# Patient Record
Sex: FEMALE | Race: WHITE | HISPANIC OR LATINO | Employment: OTHER | URBAN - METROPOLITAN AREA
[De-identification: names, ages, dates, MRNs, and addresses within clinical notes are randomized per-mention and may not be internally consistent; named-entity substitution may affect disease eponyms.]

---

## 2018-11-16 ENCOUNTER — HOSPITAL ENCOUNTER (EMERGENCY)
Facility: HOSPITAL | Age: 38
Discharge: HOME | End: 2018-11-16
Attending: EMERGENCY MEDICINE | Admitting: EMERGENCY MEDICINE

## 2018-11-16 VITALS
BODY MASS INDEX: 33.66 KG/M2 | HEART RATE: 93 BPM | OXYGEN SATURATION: 100 % | TEMPERATURE: 98.1 F | DIASTOLIC BLOOD PRESSURE: 79 MMHG | HEIGHT: 63 IN | RESPIRATION RATE: 18 BRPM | SYSTOLIC BLOOD PRESSURE: 137 MMHG | WEIGHT: 190 LBS

## 2018-11-16 DIAGNOSIS — R59.0 SUBMANDIBULAR LYMPHADENOPATHY: ICD-10-CM

## 2018-11-16 DIAGNOSIS — R51.9 FACIAL PAIN: Primary | ICD-10-CM

## 2018-11-16 PROCEDURE — 99283 EMERGENCY DEPT VISIT LOW MDM: CPT

## 2018-11-16 PROCEDURE — 25010000002 KETOROLAC TROMETHAMINE PER 15 MG: Performed by: EMERGENCY MEDICINE

## 2018-11-16 PROCEDURE — 96372 THER/PROPH/DIAG INJ SC/IM: CPT

## 2018-11-16 RX ORDER — ASPIRIN 81 MG/1
81 TABLET, CHEWABLE ORAL DAILY
COMMUNITY
End: 2021-09-03

## 2018-11-16 RX ORDER — AMOXICILLIN AND CLAVULANATE POTASSIUM 875; 125 MG/1; MG/1
1 TABLET, FILM COATED ORAL ONCE
Status: COMPLETED | OUTPATIENT
Start: 2018-11-16 | End: 2018-11-16

## 2018-11-16 RX ORDER — AMOXICILLIN AND CLAVULANATE POTASSIUM 875; 125 MG/1; MG/1
1 TABLET, FILM COATED ORAL EVERY 12 HOURS SCHEDULED
Qty: 14 TABLET | Refills: 0 | Status: SHIPPED | OUTPATIENT
Start: 2018-11-16 | End: 2020-01-01

## 2018-11-16 RX ORDER — KETOROLAC TROMETHAMINE 15 MG/ML
15 INJECTION, SOLUTION INTRAMUSCULAR; INTRAVENOUS ONCE
Status: COMPLETED | OUTPATIENT
Start: 2018-11-16 | End: 2018-11-16

## 2018-11-16 RX ADMIN — AMOXICILLIN AND CLAVULANATE POTASSIUM 1 TABLET: 875; 125 TABLET, FILM COATED ORAL at 07:04

## 2018-11-16 RX ADMIN — KETOROLAC TROMETHAMINE 15 MG: 15 INJECTION, SOLUTION INTRAMUSCULAR; INTRAVENOUS at 07:03

## 2018-11-16 NOTE — ED PROVIDER NOTES
Subjective   This 38-year-old female presents to the emergency department complaining of right facial pain which started 2 days ago.  Her discomfort is gradually increased in severity.  She now rates it 5-6 out of 10 on the pain scale.  She tried aspirin without significant improvement in discomfort.  She denies fevers, chills, nausea, vomiting.  She does report increased discomfort in the right lower jaw region occurring when she opens her mouth widely.  She does not believe she has any dental caries nor that her discomfort could be coming from her teeth.  She notes a swollen area lateral to her molars on the right side.  She notes that when she squeezes the area firmly this seems to fleeting only improve the discomfort.  She denies voice change, pain with swallowing, decreased range of motion of the neck.  Her discomfort radiates from the area of maximal discomfort up toward her right ear and to a lesser extent inferiorly.            Review of Systems   Constitutional: Negative for chills, fatigue and fever.   HENT: Positive for ear pain and facial swelling. Negative for congestion, dental problem, ear discharge, mouth sores, sinus pressure, sore throat, trouble swallowing and voice change.    Respiratory: Negative for shortness of breath.    Cardiovascular: Negative for chest pain.   Gastrointestinal: Positive for abdominal pain.   Genitourinary: Negative for dysuria.   Musculoskeletal: Negative for back pain.   Skin: Negative for rash.   Neurological: Negative for headaches.   Psychiatric/Behavioral: Negative for agitation and confusion.   All other systems reviewed and are negative.      History reviewed. No pertinent past medical history.    No Known Allergies    Past Surgical History:   Procedure Laterality Date   • CHOLECYSTECTOMY     • HIP SURGERY     • TUBAL ABDOMINAL LIGATION         History reviewed. No pertinent family history.    Social History     Socioeconomic History   • Marital status: Single      Spouse name: Not on file   • Number of children: Not on file   • Years of education: Not on file   • Highest education level: Not on file   Tobacco Use   • Smoking status: Current Every Day Smoker     Packs/day: 0.50     Types: Cigarettes   • Smokeless tobacco: Never Used   Substance and Sexual Activity   • Alcohol use: No     Frequency: Never   • Drug use: No           Objective   Physical Exam   Constitutional: She appears well-developed and well-nourished.   HENT:   Head: Normocephalic and atraumatic.   Right Ear: External ear normal.   Mouth/Throat:       No visible facial cellulitis.  No warmth to the face.  No lymphangitic streaking.   Eyes: Conjunctivae are normal.   Neck: Trachea normal, normal range of motion and phonation normal. Neck supple. No JVD present.   Cardiovascular: Normal rate, regular rhythm and normal heart sounds.   Pulmonary/Chest: Effort normal and breath sounds normal. No respiratory distress.   Abdominal: Soft. There is no tenderness.   Musculoskeletal: Normal range of motion. She exhibits no edema.   Lymphadenopathy:   Right-sided submandibular lymph node tenderness to palpation with questionable mild enlargement.  This does reproduce much of the patient's chief complaint.   Neurological: She is alert. She has normal strength. Coordination normal.   Skin: Skin is warm and dry. She is not diaphoretic. No pallor.   Psychiatric: She has a normal mood and affect. Her speech is normal and behavior is normal.   Nursing note and vitals reviewed.      Procedures           ED Course      No results found for this or any previous visit (from the past 24 hour(s)).  Note: In addition to lab results from this visit, the labs listed above may include labs taken at another facility or during a different encounter within the last 24 hours. Please correlate lab times with ED admission and discharge times for further clarification of the services performed during this visit.    No orders to display  "    Vitals:    11/16/18 0603   BP: 137/79   Pulse: 93   Resp: 18   Temp: 98.1 °F (36.7 °C)   TempSrc: Oral   SpO2: 100%   Weight: 86.2 kg (190 lb)   Height: 160 cm (63\")     Medications   ketorolac (TORADOL) injection 15 mg (15 mg Intramuscular Given 11/16/18 0703)   amoxicillin-clavulanate (AUGMENTIN) 875-125 MG per tablet 1 tablet (1 tablet Oral Given 11/16/18 0704)     ECG/EMG Results (last 24 hours)     ** No results found for the last 24 hours. **                    [facility]      Final diagnoses:   Facial pain   Submandibular lymphadenopathy            Ajit Sanchez  11/16/18 0751       Fermin Tracey MD  11/16/18 0756    "

## 2018-11-16 NOTE — DISCHARGE INSTRUCTIONS
Take antibiotics and pain medication as prescribed.     Follow up with  dental clinic next available. I recommend you visit there now.     Obtain a primary care provider and follow up with them.     Follow up with one of the physician centers below to setup primary care.    [facility], [phone], [address], [address], [place], [address]    [facility], [phone], [address], [address]    [facility], [phone], [address] [place], [address];     [facility], [phone], [address] [address]      Follow up with one of the [facility] Primary Care Providers below to setup primary care. If you need assistance coordinating a primary care appointment with a [facility] Primary Care Provider, please contact the Primary Care Coordinators at [phone] for appointment scheduling.    [facility], Primary Care   [address], [address]   [place], [place] [address] [phone]    [facility] Medicine [facility] Endocrinology  [address], [address]  [place] [address] [phone]    [facility]  [address], [address]   [place], [place] [address] [phone]    [facility] Primary [facility]  [address], [address]  [place], [place] [address] [phone]    [facility], Primary Care,   [address], [address]   [place], [place] [address] [phone]    [facility]  [address]  [place], [place] [address] [phone]    [facility], Primary Care  [address], [address]  [place], [place] [address] [phone]    [facility] Medicine [facility] Pediatric[facility]  [address] [place] [address]  [address], [address]   [place] [address] [phone]    [facility], Primary Care  [address] [place] [address] [phone]      [facility]  [address], [address]   [place] [address] [phone]    [facility]  [address] [place] [address] [place] [address] [phone]          Return if worse.

## 2020-01-01 ENCOUNTER — HOSPITAL ENCOUNTER (EMERGENCY)
Facility: HOSPITAL | Age: 40
Discharge: HOME | End: 2020-01-01
Attending: EMERGENCY MEDICINE | Admitting: EMERGENCY MEDICINE

## 2020-01-01 VITALS
WEIGHT: 175 LBS | BODY MASS INDEX: 32.2 KG/M2 | SYSTOLIC BLOOD PRESSURE: 115 MMHG | OXYGEN SATURATION: 99 % | DIASTOLIC BLOOD PRESSURE: 78 MMHG | RESPIRATION RATE: 16 BRPM | HEART RATE: 90 BPM | TEMPERATURE: 98.6 F | HEIGHT: 62 IN

## 2020-01-01 DIAGNOSIS — L02.91 ABSCESS: ICD-10-CM

## 2020-01-01 DIAGNOSIS — L73.2 HIDRADENITIS SUPPURATIVA: Primary | ICD-10-CM

## 2020-01-01 PROCEDURE — 87070 CULTURE OTHR SPECIMN AEROBIC: CPT | Performed by: PHYSICIAN ASSISTANT

## 2020-01-01 PROCEDURE — 87205 SMEAR GRAM STAIN: CPT | Performed by: PHYSICIAN ASSISTANT

## 2020-01-01 PROCEDURE — 99283 EMERGENCY DEPT VISIT LOW MDM: CPT

## 2020-01-01 RX ORDER — HYDROCODONE BITARTRATE AND ACETAMINOPHEN 5; 325 MG/1; MG/1
1 TABLET ORAL EVERY 6 HOURS PRN
Qty: 12 TABLET | Refills: 0 | Status: SHIPPED | OUTPATIENT
Start: 2020-01-01 | End: 2021-09-03

## 2020-01-01 RX ORDER — CLINDAMYCIN HYDROCHLORIDE 300 MG/1
300 CAPSULE ORAL 3 TIMES DAILY
Qty: 30 CAPSULE | Refills: 0 | Status: SHIPPED | OUTPATIENT
Start: 2020-01-01 | End: 2020-01-11

## 2020-01-01 RX ORDER — ALPRAZOLAM 0.5 MG/1
0.5 TABLET ORAL DAILY
COMMUNITY
End: 2021-09-03

## 2020-01-01 RX ORDER — LIDOCAINE HYDROCHLORIDE 10 MG/ML
5 INJECTION, SOLUTION EPIDURAL; INFILTRATION; INTRACAUDAL; PERINEURAL ONCE
Status: COMPLETED | OUTPATIENT
Start: 2020-01-01 | End: 2020-01-01

## 2020-01-01 RX ORDER — HYDROCODONE BITARTRATE AND ACETAMINOPHEN 5; 325 MG/1; MG/1
1 TABLET ORAL ONCE
Status: COMPLETED | OUTPATIENT
Start: 2020-01-01 | End: 2020-01-01

## 2020-01-01 RX ADMIN — LIDOCAINE HYDROCHLORIDE 5 ML: 10 INJECTION, SOLUTION EPIDURAL; INFILTRATION; INTRACAUDAL; PERINEURAL at 14:54

## 2020-01-01 RX ADMIN — HYDROCODONE BITARTRATE AND ACETAMINOPHEN 1 TABLET: 5; 325 TABLET ORAL at 14:54

## 2020-01-01 NOTE — DISCHARGE INSTRUCTIONS
You have been seen for hidradenitis and abscesses.  Please return here for any fever, vomiting, increased pain.  Please follow-up with dermatology for further management.

## 2020-01-01 NOTE — ED PROVIDER NOTES
Subjective   Ms. Desmond Bishop is a 39 y.o. female who presents to the ED with c/o abscess. She reports this has been an ongoing, recurrent problem for several years, however she has one on her medial right upper leg which is worse than normal and painful. The are also She complains of sleep disturbance and difficulty walking secondary to pain. The abscess is draining. She denies fever or vomiting. She is a current every day smoker but denies drug or alcohol use. She denies pregnancy. There are no other acute symptoms at this time.      History provided by:  Patient  Abscess   Location:  Leg and torso  Torso abscess location:  L breast and R breast  Leg abscess location:  R upper leg and L upper leg  Abscess quality: painful and redness    Duration:  3 weeks  Progression:  Worsening  Chronicity:  Recurrent  Relieved by:  None tried  Exacerbated by: Walking.  Ineffective treatments:  None tried  Associated symptoms: no fever, no nausea and no vomiting    Risk factors: prior abscess        Review of Systems   Constitutional: Negative for chills and fever.   Gastrointestinal: Negative for nausea and vomiting.   Skin: Positive for color change and wound.   All other systems reviewed and are negative.      History reviewed. No pertinent past medical history.    No Known Allergies    Past Surgical History:   Procedure Laterality Date   • CHOLECYSTECTOMY     • HIP SURGERY     • TUBAL ABDOMINAL LIGATION         History reviewed. No pertinent family history.    Social History     Socioeconomic History   • Marital status: Single     Spouse name: Not on file   • Number of children: Not on file   • Years of education: Not on file   • Highest education level: Not on file   Tobacco Use   • Smoking status: Current Every Day Smoker     Packs/day: 0.50     Types: Cigarettes   • Smokeless tobacco: Never Used   Substance and Sexual Activity   • Alcohol use: No     Frequency: Never   • Drug use: No   • Sexual activity: Defer          Objective   Physical Exam   Constitutional: She is oriented to person, place, and time. She appears well-developed and well-nourished. No distress.   HENT:   Head: Normocephalic and atraumatic.   Nose: Nose normal.   Eyes: Conjunctivae are normal. No scleral icterus.   Neck: Normal range of motion. Neck supple.   Cardiovascular: Normal rate, regular rhythm and normal heart sounds. Exam reveals no gallop and no friction rub.   No murmur heard.  Pulmonary/Chest: Effort normal and breath sounds normal. No stridor. No respiratory distress. She has no wheezes.   Abdominal: Soft. There is no tenderness.   Musculoskeletal: Normal range of motion. She exhibits no edema.   Neurological: She is alert and oriented to person, place, and time.   Skin: Skin is warm and dry. There is erythema.   On the medial breasts there are multiple erythematous, draining lesions, also on the right thigh. On the left thigh there is 6 cm of erythema with central opening and some drainage. She refuses to let me take off the bandage on the left thigh to assess.   Psychiatric: She has a normal mood and affect. Her behavior is normal.   Nursing note and vitals reviewed.      Incision & Drainage  Date/Time: 1/1/2020 8:06 PM  Performed by: Katerine Ford PA-C  Authorized by: Delbert Villavicencio MD     Consent:     Consent obtained:  Verbal    Consent given by:  Patient    Risks discussed:  Incomplete drainage and infection  Location:     Type:  Abscess    Size:  1 cm fluctuance    Location:  Lower extremity    Lower extremity location:  Leg    Leg location:  R upper leg  Pre-procedure details:     Skin preparation:  Betadine  Anesthesia (see MAR for exact dosages):     Anesthesia method:  Local infiltration    Local anesthetic:  Lidocaine 1% w/o epi  Procedure type:     Complexity:  Simple  Procedure details:     Needle aspiration: no      Incision types:  Stab incision    Incision depth:  Dermal    Scalpel blade:  11    Drainage:   "Purulent    Drainage amount:  Scant    Wound treatment:  Wound left open (patient refused packing)  Post-procedure details:     Patient tolerance of procedure:  Tolerated well, no immediate complications             ED Course  ED Course as of Jan 01 2008 Wed Jan 01, 2020   1437 Patient presents with multiple open wounds which are draining purulent fluid.  She refuses to let me assess the axilla and left groin.  In the center of the chest she has some infected lesions and in the right groin.  There is an abscess of the right thigh which I feel can benefit from I&D to increase the size of the opening for drainage.  Given that this is a recurrent problem and the extent of the affected area his peers to be consistent with hidradenitis separativa.  I will start patient on clindamycin and give her follow-up with dermatology.    [WT]   [facility] request number: [id number]    [JW]      ED Course User Index  [JW] Fuad Willett  [WT] Katerine Ford, SONIDO     No results found for this or any previous visit (from the past 24 hour(s)).  Note: In addition to lab results from this visit, the labs listed above may include labs taken at another facility or during a different encounter within the last 24 hours. Please correlate lab times with ED admission and discharge times for further clarification of the services performed during this visit.    No orders to display     Vitals:    01/01/20 1413 01/01/20 1427 01/01/20 1633   BP: 118/80  115/78   Pulse: 97  90   Resp: 16  16   Temp: 98.6 °F (37 °C)     SpO2: 99%  99%   Weight:  79.4 kg (175 lb)    Height:  157.5 cm (62\")      Medications   HYDROcodone-acetaminophen (NORCO) 5-325 MG per tablet 1 tablet (1 tablet Oral Given 1/1/20 1454)   lidocaine PF 1% (XYLOCAINE) injection 5 mL (5 mL Injection Given 1/1/20 1454)     ECG/EMG Results (last 24 hours)     ** No results found for the last 24 hours. **        No orders to display                       MDM  Number of Diagnoses or " Management Options  Hidradenitis suppurativa:   Diagnosis management comments: Patient presents complaining of painful tender swelling to the right groin and thigh.  She does have active drainage.  She refuses full physical exam including wounds in the left groin and axilla.  She has some draining lesions to her medial breast however refuses palpation to these areas.  I&D was performed with some purulent drainage.  Patient will be placed on clindamycin and was given follow-up with dermatology.  Kris was reviewed and she was given a short course of pain medication.      Final diagnoses:   Hidradenitis suppurativa   Abscess       Documentation assistance provided by nabila Willett.  Information recorded by the nabila was done at my direction and has been verified and validated by me.     Fuad Willett  01/01/20 1441       Katerine Ford PA-C  01/01/20 2008

## 2020-01-03 LAB
BACTERIA SPEC AEROBE CULT: NORMAL
GRAM STN SPEC: NORMAL

## 2020-01-06 ENCOUNTER — OFFICE VISIT (OUTPATIENT)
Dept: SURGERY | Facility: CLINIC | Age: 40
End: 2020-01-06

## 2020-01-06 VITALS
BODY MASS INDEX: 31.1 KG/M2 | OXYGEN SATURATION: 91 % | HEART RATE: 81 BPM | SYSTOLIC BLOOD PRESSURE: 100 MMHG | WEIGHT: 169 LBS | HEIGHT: 62 IN | DIASTOLIC BLOOD PRESSURE: 80 MMHG | TEMPERATURE: 98.8 F

## 2020-01-06 DIAGNOSIS — L73.2 HIDRADENITIS: Primary | ICD-10-CM

## 2020-01-06 PROCEDURE — 99203 OFFICE O/P NEW LOW 30 MIN: CPT | Performed by: SURGERY

## 2020-01-06 NOTE — PROGRESS NOTES
Patient: Desmond Bishop    YOB: 1980    Date: 01/06/2020    Primary Care Provider: Provider, No Known    Chief Complaint   Patient presents with   • Cyst       SUBJECTIVE:    History of present illness:  Patient is here for an evaluation and treatment of a cyst. She complains of multiple mass' all over body. She complains of associated pain.  Patient unfortunately has multiple complicated areas of hidradenitis in the mid chest and breast region, left axilla and bilateral groins.  Patient has been seen at [facility] by plastic surgery and general surgery but patient states that they were not willing to perform surgery on her.  She has been placed on antibiotics and continues to have discomfort and drainage.    The following portions of the patient's history were reviewed and updated as appropriate: allergies, current medications, past family history, past medical history, past social history, past surgical history and problem list.      Review of Systems   Constitutional: Negative for chills, fever and unexpected weight change.   HENT: Negative for hearing loss, trouble swallowing and voice change.    Eyes: Negative for visual disturbance.   Respiratory: Negative for apnea, cough, chest tightness, shortness of breath and wheezing.    Cardiovascular: Negative for chest pain, palpitations and leg swelling.   Gastrointestinal: Negative for abdominal distention, abdominal pain, anal bleeding, blood in stool, constipation, diarrhea, nausea, rectal pain and vomiting.   Endocrine: Negative for cold intolerance and heat intolerance.   Genitourinary: Negative for difficulty urinating, dysuria and flank pain.   Musculoskeletal: Negative for back pain and gait problem.   Skin: Negative for color change, rash and wound.   Neurological: Negative for dizziness, syncope, speech difficulty, weakness, light-headedness, numbness and headaches.   Hematological: Negative for adenopathy. Does not bruise/bleed  "easily.   Psychiatric/Behavioral: Negative for confusion. The patient is not nervous/anxious.        Allergies:  No Known Allergies    Medications:    Current Outpatient Medications:   •  ALPRAZolam (XANAX) 0.5 MG tablet, Take 0.5 mg by mouth Daily., Disp: , Rfl:   •  aspirin 81 MG chewable tablet, Chew 81 mg Daily., Disp: , Rfl:   •  clindamycin (CLEOCIN) 300 MG capsule, Take 1 capsule by mouth 3 (Three) Times a Day for 10 days., Disp: 30 capsule, Rfl: 0  •  HYDROcodone-acetaminophen (NORCO) 5-325 MG per tablet, Take 1 tablet by mouth Every 6 (Six) Hours As Needed for Severe Pain ., Disp: 12 tablet, Rfl: 0    History:  History reviewed. No pertinent past medical history.    Past Surgical History:   Procedure Laterality Date   • CHOLECYSTECTOMY     • HIP SURGERY     • TUBAL ABDOMINAL LIGATION         History reviewed. No pertinent family history.    Social History     Tobacco Use   • Smoking status: Current Every Day Smoker     Packs/day: 0.50     Types: Cigarettes   • Smokeless tobacco: Never Used   Substance Use Topics   • Alcohol use: No     Frequency: Never   • Drug use: No        OBJECTIVE:    Vital Signs:   Vitals:    01/06/20 1500   BP: 100/80   Pulse: 81   Temp: 98.8 °F (37.1 °C)   SpO2: 91%   Weight: 76.7 kg (169 lb)   Height: 157.5 cm (62.01\")       Physical Exam:   General Appearance:    Alert, cooperative, in no acute distress   Head:    Normocephalic, without obvious abnormality, atraumatic   Eyes:            Lids and lashes normal, conjunctivae and sclerae normal, no   icterus, no pallor, corneas clear, PERRLA   Ears:    Ears appear intact with no abnormalities noted   Throat:   No oral lesions, no thrush, oral mucosa moist   Neck:   No adenopathy, supple, trachea midline, no thyromegaly, no   carotid bruit, no JVD   Lungs:     Clear to auscultation,respirations regular, even and                  unlabored    Heart:    Regular rhythm and normal rate, normal S1 and S2, no            murmur, no gallop, " no rub, no click   Chest Wall:    No abnormalities observed   Abdomen:     Normal bowel sounds, no masses, no organomegaly, soft        non-tender, non-distended, no guarding, no rebound                tenderness   Extremities:   Moves all extremities well, no edema, no cyanosis, no             redness   Pulses:   Pulses palpable and equal bilaterally   Skin:   No bleeding, bruising or rash.  Severe extensive areas of hidradenitis in the mid chest left axilla and bilateral groins.   Lymph nodes:   No palpable adenopathy   Neurologic:   Cranial nerves 2 - 12 grossly intact, sensation intact, DTR       present and equal bilaterally     Results Review:   I reviewed the patient's new clinical results.    Review of Systems was reviewed and confirmed as accurate as documented by the MA.    ASSESSMENT/PLAN:    1. Hidradenitis        I had a long discussion with the patient, her mother and I feel like I am not qualified to be able to excise the areas of complicated hidradenitis she has.  It would require extensive skin grafting and flaps that I am not able to do.  I told patient she will need to go back to the [facility] for reevaluation and treatment.  Her condition is outside of my skill level.  Otherwise follow-up as needed.  We will attempt to reschedule appointment for her to see plastic surgery in [place] in the near future.    I discussed the patients findings and my recommendations with patient        Electronically signed by Sae Vigil MD  01/06/20      .  Portions of this note has been scribed for Sae Vigil MD by Susanne Castañeda. 1/6/2020  3:57 PM

## 2020-01-07 DIAGNOSIS — L73.2 HIDRADENITIS: Primary | ICD-10-CM

## 2020-08-30 ENCOUNTER — HOSPITAL ENCOUNTER (EMERGENCY)
Facility: HOSPITAL | Age: 40
Discharge: PSYCHIATRIC OR SUBSTANCE USE FACILITY | End: 2020-08-30
Attending: EMERGENCY MEDICINE | Admitting: EMERGENCY MEDICINE

## 2020-08-30 VITALS
BODY MASS INDEX: 30.73 KG/M2 | DIASTOLIC BLOOD PRESSURE: 79 MMHG | RESPIRATION RATE: 16 BRPM | HEART RATE: 106 BPM | HEIGHT: 64 IN | OXYGEN SATURATION: 95 % | SYSTOLIC BLOOD PRESSURE: 111 MMHG | WEIGHT: 180 LBS | TEMPERATURE: 97.8 F

## 2020-08-30 DIAGNOSIS — R45.851 SUICIDAL IDEATION: Primary | ICD-10-CM

## 2020-08-30 LAB
ALBUMIN SERPL-MCNC: 4 G/DL (ref 3.5–5.2)
ALBUMIN/GLOB SERPL: 0.8 G/DL
ALP SERPL-CCNC: 113 U/L (ref 39–117)
ALT SERPL W P-5'-P-CCNC: 9 U/L (ref 1–33)
AMPHET+METHAMPHET UR QL: NEGATIVE
AMPHETAMINES UR QL: NEGATIVE
ANION GAP SERPL CALCULATED.3IONS-SCNC: 9 MMOL/L (ref 5–15)
APAP SERPL-MCNC: <5 MCG/ML (ref 10–30)
AST SERPL-CCNC: 16 U/L (ref 1–32)
B-HCG UR QL: NEGATIVE
B-HCG UR QL: NEGATIVE
BARBITURATES UR QL SCN: NEGATIVE
BASOPHILS # BLD AUTO: 0.03 10*3/MM3 (ref 0–0.2)
BASOPHILS NFR BLD AUTO: 0.3 % (ref 0–1.5)
BENZODIAZ UR QL SCN: NEGATIVE
BILIRUB SERPL-MCNC: 0.6 MG/DL (ref 0–1.2)
BUN SERPL-MCNC: 10 MG/DL (ref 6–20)
BUN/CREAT SERPL: 15.9 (ref 7–25)
BUPRENORPHINE SERPL-MCNC: NEGATIVE NG/ML
CALCIUM SPEC-SCNC: 8.8 MG/DL (ref 8.6–10.5)
CANNABINOIDS SERPL QL: NEGATIVE
CHLORIDE SERPL-SCNC: 102 MMOL/L (ref 98–107)
CO2 SERPL-SCNC: 25 MMOL/L (ref 22–29)
COCAINE UR QL: NEGATIVE
CREAT SERPL-MCNC: 0.63 MG/DL (ref 0.57–1)
DEPRECATED RDW RBC AUTO: 44.3 FL (ref 37–54)
EOSINOPHIL # BLD AUTO: 0.16 10*3/MM3 (ref 0–0.4)
EOSINOPHIL NFR BLD AUTO: 1.4 % (ref 0.3–6.2)
ERYTHROCYTE [DISTWIDTH] IN BLOOD BY AUTOMATED COUNT: 14.1 % (ref 12.3–15.4)
ETHANOL BLD-MCNC: <10 MG/DL (ref 0–10)
GFR SERPL CREATININE-BSD FRML MDRD: 106 ML/MIN/1.73
GLOBULIN UR ELPH-MCNC: 4.9 GM/DL
GLUCOSE SERPL-MCNC: 102 MG/DL (ref 65–99)
HCT VFR BLD AUTO: 40.3 % (ref 34–46.6)
HGB BLD-MCNC: 12.8 G/DL (ref 12–15.9)
HOLD SPECIMEN: NORMAL
HOLD SPECIMEN: NORMAL
IMM GRANULOCYTES # BLD AUTO: 0.04 10*3/MM3 (ref 0–0.05)
IMM GRANULOCYTES NFR BLD AUTO: 0.4 % (ref 0–0.5)
INTERNAL NEGATIVE CONTROL: NEGATIVE
INTERNAL POSITIVE CONTROL: POSITIVE
LYMPHOCYTES # BLD AUTO: 2.77 10*3/MM3 (ref 0.7–3.1)
LYMPHOCYTES NFR BLD AUTO: 24.8 % (ref 19.6–45.3)
Lab: NORMAL
MCH RBC QN AUTO: 27.6 PG (ref 26.6–33)
MCHC RBC AUTO-ENTMCNC: 31.8 G/DL (ref 31.5–35.7)
MCV RBC AUTO: 86.9 FL (ref 79–97)
METHADONE UR QL SCN: NEGATIVE
MONOCYTES # BLD AUTO: 0.9 10*3/MM3 (ref 0.1–0.9)
MONOCYTES NFR BLD AUTO: 8.1 % (ref 5–12)
NEUTROPHILS NFR BLD AUTO: 65 % (ref 42.7–76)
NEUTROPHILS NFR BLD AUTO: 7.27 10*3/MM3 (ref 1.7–7)
NRBC BLD AUTO-RTO: 0 /100 WBC (ref 0–0.2)
OPIATES UR QL: POSITIVE
OXYCODONE UR QL SCN: NEGATIVE
PCP UR QL SCN: NEGATIVE
PLATELET # BLD AUTO: 401 10*3/MM3 (ref 140–450)
PMV BLD AUTO: 9.3 FL (ref 6–12)
POTASSIUM SERPL-SCNC: 3.8 MMOL/L (ref 3.5–5.2)
PROPOXYPH UR QL: NEGATIVE
PROT SERPL-MCNC: 8.9 G/DL (ref 6–8.5)
RBC # BLD AUTO: 4.64 10*6/MM3 (ref 3.77–5.28)
SALICYLATES SERPL-MCNC: <0.3 MG/DL
SODIUM SERPL-SCNC: 136 MMOL/L (ref 136–145)
TRICYCLICS UR QL SCN: NEGATIVE
WBC # BLD AUTO: 11.17 10*3/MM3 (ref 3.4–10.8)
WHOLE BLOOD HOLD SPECIMEN: NORMAL
WHOLE BLOOD HOLD SPECIMEN: NORMAL

## 2020-08-30 PROCEDURE — 80053 COMPREHEN METABOLIC PANEL: CPT | Performed by: PHYSICIAN ASSISTANT

## 2020-08-30 PROCEDURE — 99284 EMERGENCY DEPT VISIT MOD MDM: CPT

## 2020-08-30 PROCEDURE — 80307 DRUG TEST PRSMV CHEM ANLYZR: CPT | Performed by: PHYSICIAN ASSISTANT

## 2020-08-30 PROCEDURE — 85025 COMPLETE CBC W/AUTO DIFF WBC: CPT | Performed by: PHYSICIAN ASSISTANT

## 2020-08-30 PROCEDURE — 81025 URINE PREGNANCY TEST: CPT | Performed by: EMERGENCY MEDICINE

## 2020-08-30 PROCEDURE — 80307 DRUG TEST PRSMV CHEM ANLYZR: CPT | Performed by: EMERGENCY MEDICINE

## 2021-09-03 ENCOUNTER — OFFICE VISIT (OUTPATIENT)
Dept: FAMILY MEDICINE CLINIC | Facility: CLINIC | Age: 41
End: 2021-09-03

## 2021-09-03 ENCOUNTER — TELEPHONE (OUTPATIENT)
Dept: FAMILY MEDICINE CLINIC | Facility: CLINIC | Age: 41
End: 2021-09-03

## 2021-09-03 VITALS
SYSTOLIC BLOOD PRESSURE: 124 MMHG | RESPIRATION RATE: 18 BRPM | WEIGHT: 182 LBS | HEIGHT: 63 IN | TEMPERATURE: 98.6 F | OXYGEN SATURATION: 98 % | DIASTOLIC BLOOD PRESSURE: 76 MMHG | BODY MASS INDEX: 32.25 KG/M2 | HEART RATE: 78 BPM

## 2021-09-03 DIAGNOSIS — Z71.6 ENCOUNTER FOR SMOKING CESSATION COUNSELING: Primary | ICD-10-CM

## 2021-09-03 DIAGNOSIS — L73.2 HIDRADENITIS SUPPURATIVA: ICD-10-CM

## 2021-09-03 PROCEDURE — 99203 OFFICE O/P NEW LOW 30 MIN: CPT | Performed by: NURSE PRACTITIONER

## 2021-09-03 RX ORDER — BUPROPION HYDROCHLORIDE 150 MG/1
150 TABLET, EXTENDED RELEASE ORAL DAILY
Qty: 30 TABLET | Refills: 2 | Status: SHIPPED | OUTPATIENT
Start: 2021-09-03 | End: 2021-12-07

## 2021-09-03 NOTE — ASSESSMENT & PLAN NOTE
Provided printed education materials and discussed alternatives to smoking with patient  Start Zyban as directed and education provided

## 2021-09-03 NOTE — ASSESSMENT & PLAN NOTE
Suggested dermatology referral and patient declines  Patient requests referral to infectious disease for this condition because she states she has been treated by several dermatologist in past with no relief and states last dermatologist told her she needed referral to infectious disease.

## 2021-09-03 NOTE — PROGRESS NOTES
"Chief Complaint  quit smoking   Infected lesions    Subjective          Desmond Bishop presents to [facility]  Patient presents to office requesting help with smoking cessation. Reports a greater than 10 years smoking history and smokes 20 cigarettes per day. Has tried nicotine patches with no success. Scared of Chantix due to side effects. States friend took Zyban and quit smoking and she would like to try this.   Requesting referral to infectious disease doctor for evaluation and treatment of hidrenatous suppurativa because she had cystic lesions scattered to chest and back that are painful. Explains she went to several dermatologist in past with last being 6 months ago and no relief. Explains last dermatologist told her she would need infectious disease referral and she is adamant to see one.       Objective   Vital Signs:   /76   Pulse 78   Temp 98.6 °F (37 °C)   Resp 18   Ht 160 cm (63\")   Wt 82.6 kg (182 lb)   SpO2 98%   BMI 32.24 kg/m²     Physical Exam  Vitals and nursing note reviewed.   Constitutional:       Appearance: Normal appearance.   HENT:      Head: Normocephalic and atraumatic.      Right Ear: External ear normal.      Left Ear: External ear normal.      Nose: Nose normal.   Cardiovascular:      Rate and Rhythm: Normal rate and regular rhythm.      Pulses: Normal pulses.      Heart sounds: Normal heart sounds.   Pulmonary:      Effort: Pulmonary effort is normal.      Breath sounds: Normal breath sounds.   Abdominal:      General: Bowel sounds are normal. There is no distension.      Palpations: Abdomen is soft.      Tenderness: There is no abdominal tenderness.   Musculoskeletal:         General: Normal range of motion.      Cervical back: Normal range of motion and neck supple.   Skin:     General: Skin is warm and dry.      Findings: Rash present. Rash is papular and pustular.             Comments: Scattered pustule lesions  to back   Neurological: "      General: No focal deficit present.      Mental Status: She is alert and oriented to person, place, and time.   Psychiatric:         Attention and Perception: Attention normal.         Mood and Affect: Mood is anxious.         Speech: Speech normal.         Behavior: Behavior is cooperative.        Result Review :          Assessment and Plan    Diagnoses and all orders for this visit:    1. Encounter for smoking cessation counseling (Primary)  Assessment & Plan:  Provided printed education materials and discussed alternatives to smoking with patient  Start Zyban as directed and education provided      Orders:  -     buPROPion (Zyban) 150 MG 12 hr tablet; Take 150 mg by mouth Daily.  Dispense: 30 tablet; Refill: 2    2. Hidradenitis suppurativa  Assessment & Plan:  Suggested dermatology referral and patient declines  Patient requests referral to infectious disease for this condition because she states she has been treated by several dermatologist in past with no relief and states last dermatologist told her she needed referral to infectious disease.     Orders:  -     Ambulatory Referral to Infectious Disease    I spent 25 minutes caring for Desmond on this date of service. This time includes time spent by me in the following activities:preparing for the visit, obtaining and/or reviewing a separately obtained history, performing a medically appropriate examination and/or evaluation , counseling and educating the patient/family/caregiver, ordering medications, tests, or procedures, referring and communicating with other health care professionals  and documenting information in the medical record  Follow Up   Return in about 3 months (around 12/3/2021) for Annual.  Patient was given instructions and counseling regarding her condition or for health maintenance advice. Please see specific information pulled into the AVS if appropriate.

## 2021-09-03 NOTE — TELEPHONE ENCOUNTER
Patient came into office after her appointment to ask for her prescription to be sent to [facility],.   Prescription was send but pharmacy states they did not receive it.  I called and spoke to Britt FATMATA and gave her verbal of prescription that was sent electronically.   Patient informed to go to pharmacy to  her prescription.

## 2021-11-12 ENCOUNTER — OFFICE VISIT (OUTPATIENT)
Dept: FAMILY MEDICINE CLINIC | Facility: CLINIC | Age: 41
End: 2021-11-12

## 2021-11-12 VITALS
TEMPERATURE: 96.6 F | WEIGHT: 183.8 LBS | BODY MASS INDEX: 32.57 KG/M2 | OXYGEN SATURATION: 98 % | HEIGHT: 63 IN | RESPIRATION RATE: 16 BRPM | HEART RATE: 73 BPM | SYSTOLIC BLOOD PRESSURE: 122 MMHG | DIASTOLIC BLOOD PRESSURE: 80 MMHG

## 2021-11-12 DIAGNOSIS — L72.9 INFECTED CYST OF SKIN: Primary | ICD-10-CM

## 2021-11-12 DIAGNOSIS — L08.9 INFECTED CYST OF SKIN: Primary | ICD-10-CM

## 2021-11-12 PROCEDURE — 87076 CULTURE ANAEROBE IDENT EACH: CPT | Performed by: NURSE PRACTITIONER

## 2021-11-12 PROCEDURE — 87077 CULTURE AEROBIC IDENTIFY: CPT | Performed by: NURSE PRACTITIONER

## 2021-11-12 PROCEDURE — 87205 SMEAR GRAM STAIN: CPT | Performed by: NURSE PRACTITIONER

## 2021-11-12 PROCEDURE — 87186 SC STD MICRODIL/AGAR DIL: CPT | Performed by: NURSE PRACTITIONER

## 2021-11-12 PROCEDURE — 87070 CULTURE OTHR SPECIMN AEROBIC: CPT | Performed by: NURSE PRACTITIONER

## 2021-11-12 PROCEDURE — 99213 OFFICE O/P EST LOW 20 MIN: CPT | Performed by: NURSE PRACTITIONER

## 2021-11-12 RX ORDER — DOXYCYCLINE 100 MG/1
100 TABLET ORAL 2 TIMES DAILY
Qty: 14 TABLET | Refills: 0 | Status: SHIPPED | OUTPATIENT
Start: 2021-11-12 | End: 2021-11-19

## 2021-11-12 RX ORDER — MUPIROCIN CALCIUM 20 MG/G
1 CREAM TOPICAL 3 TIMES DAILY
Qty: 30 G | Refills: 1 | Status: SHIPPED | OUTPATIENT
Start: 2021-11-12 | End: 2021-12-07

## 2021-11-14 LAB
BACTERIA SPEC AEROBE CULT: ABNORMAL
GRAM STN SPEC: ABNORMAL

## 2021-11-15 DIAGNOSIS — L72.9 INFECTED CYST OF SKIN: Primary | ICD-10-CM

## 2021-11-15 DIAGNOSIS — L08.9 INFECTED CYST OF SKIN: Primary | ICD-10-CM

## 2021-11-15 RX ORDER — SULFAMETHOXAZOLE AND TRIMETHOPRIM 800; 160 MG/1; MG/1
1 TABLET ORAL 2 TIMES DAILY
Qty: 14 TABLET | Refills: 0 | Status: SHIPPED | OUTPATIENT
Start: 2021-11-15 | End: 2021-11-22

## 2021-11-15 NOTE — PROGRESS NOTES
"Chief Complaint  Cyst (located on her neck)    Subjective          Desmond Bishop presents to [facility]  Cyst  This is a new problem. The current episode started in the past 7 days. The problem occurs constantly. The problem has been gradually worsening. Pertinent negatives include no chills or fever. Exacerbated by: palpation. Treatments tried: antibiotic from ER visit 11-. The treatment provided no relief.       Objective   Vital Signs:   /80   Pulse 73   Temp 96.6 °F (35.9 °C)   Resp 16   Ht 160 cm (63\")   Wt 83.4 kg (183 lb 12.8 oz)   SpO2 98%   BMI 32.56 kg/m²     Physical Exam  Vitals and nursing note reviewed.   Constitutional:       General: She is not in acute distress.     Appearance: Normal appearance.   HENT:      Head: Normocephalic.      Right Ear: External ear normal.      Left Ear: External ear normal.      Nose: Nose normal.   Eyes:      Extraocular Movements: Extraocular movements intact.      Conjunctiva/sclera: Conjunctivae normal.      Pupils: Pupils are equal, round, and reactive to light.   Cardiovascular:      Rate and Rhythm: Regular rhythm.      Heart sounds: Normal heart sounds.   Pulmonary:      Effort: Pulmonary effort is normal.      Breath sounds: Normal breath sounds.   Musculoskeletal:      Right lower leg: No edema.      Left lower leg: No edema.   Skin:     General: Skin is warm and dry.      Findings: Abscess and erythema present.          Neurological:      Mental Status: She is alert and oriented to person, place, and time.   Psychiatric:         Mood and Affect: Mood normal.         Behavior: Behavior normal.         Thought Content: Thought content normal.         Judgment: Judgment normal.                 Assessment and Plan    Diagnoses and all orders for this visit:    1. Infected cyst of skin (Primary)  Assessment & Plan:  I&D performed in office per sterile technique and culture sent to lab. Patient tolerated well and " started Doxycycline antibiotic and mupirocin ointment this visit. Will advise patient of any abnormal results or start of new treatment pending culture. Instructed to follow up in 1 week or go to ER if worsening of wound develops with fever or red streaks from wound. Instructed to keep area clean and dry. Suggested dermatology referral and patient declines. Patient requests infectious disease referral for hidradenitis suppurativa and explained ID referral sent previously and refusal for this problem.     Orders:  -     Wound Culture - Wound, Neck  -     doxycycline (ADOXA) 100 MG tablet; Take 1 tablet by mouth 2 (Two) Times a Day for 7 days.  Dispense: 14 tablet; Refill: 0  -     mupirocin (Bactroban) 2 % cream; Apply 1 application topically to the appropriate area as directed 3 (Three) Times a Day.  Dispense: 30 g; Refill: 1    Other orders  -     Incision & Drainage  Incision & Drainage    Date/Time: 11/15/2021 5:16 PM  Performed by: Agnieszka Islas APRN  Authorized by: Agnieszka Islas APRN   Consent: Verbal consent obtained.  Risks and benefits: risks, benefits and alternatives were discussed  Consent given by: patient  Patient understanding: patient states understanding of the procedure being performed  Patient consent: the patient's understanding of the procedure matches consent given  Type: abscess  Body area: head/neck    Anesthesia:  Local Anesthetic: lidocaine 1% with epinephrine  Anesthetic total: 2.5 mL    Sedation:  Patient sedated: no    Scalpel size: 10  Needle gauge: 22  Incision type: single straight  Drainage: purulent  Drainage amount: copious  Wound treatment: wound left open  Patient tolerance: patient tolerated the procedure well with no immediate complications  Comments: Cleansed wound and applied gauze dressing and bandage.           Follow Up   Return in about 1 week (around 11/19/2021) for Recheck neck abscess.  Patient was given instructions and counseling regarding her  condition or for health maintenance advice. Please see specific information pulled into the AVS if appropriate.

## 2021-11-15 NOTE — ASSESSMENT & PLAN NOTE
I&D performed in office per sterile technique and culture sent to lab. Patient tolerated well and started Doxycycline antibiotic and mupirocin ointment this visit. Will advise patient of any abnormal results or start of new treatment pending culture. Instructed to follow up in 1 week or go to ER if worsening of wound develops with fever or red streaks from wound. Instructed to keep area clean and dry. Suggested dermatology referral and patient declines. Patient requests infectious disease referral for hidradenitis suppurativa and explained ID referral sent previously and refusal for this problem.

## 2021-11-18 ENCOUNTER — TELEPHONE (OUTPATIENT)
Dept: FAMILY MEDICINE CLINIC | Facility: CLINIC | Age: 41
End: 2021-11-18

## 2021-11-19 ENCOUNTER — TELEPHONE (OUTPATIENT)
Dept: FAMILY MEDICINE CLINIC | Facility: CLINIC | Age: 41
End: 2021-11-19

## 2021-11-19 NOTE — TELEPHONE ENCOUNTER
----- Message from MANGO Whitten sent at 11/15/2021  3:20 PM EST -----  Reviewed wound culture report and current antibiotic resistant to bacteria that grew in culture. Sent Bactrim for her to start instead that is susceptible to bacteria found. Follow up as planned. Please inform patient.

## 2021-12-07 ENCOUNTER — OFFICE VISIT (OUTPATIENT)
Dept: FAMILY MEDICINE CLINIC | Facility: CLINIC | Age: 41
End: 2021-12-07

## 2021-12-07 VITALS
HEART RATE: 72 BPM | SYSTOLIC BLOOD PRESSURE: 118 MMHG | RESPIRATION RATE: 16 BRPM | DIASTOLIC BLOOD PRESSURE: 74 MMHG | WEIGHT: 186.4 LBS | OXYGEN SATURATION: 98 % | TEMPERATURE: 97.8 F | HEIGHT: 63 IN | BODY MASS INDEX: 33.03 KG/M2

## 2021-12-07 DIAGNOSIS — E66.09 CLASS 1 OBESITY DUE TO EXCESS CALORIES WITHOUT SERIOUS COMORBIDITY WITH BODY MASS INDEX (BMI) OF 31.0 TO 31.9 IN ADULT: ICD-10-CM

## 2021-12-07 DIAGNOSIS — R11.0 NAUSEA: ICD-10-CM

## 2021-12-07 DIAGNOSIS — Z00.00 INITIAL MEDICARE ANNUAL WELLNESS VISIT: Primary | ICD-10-CM

## 2021-12-07 DIAGNOSIS — Z91.89 SEDENTARY LIFESTYLE: ICD-10-CM

## 2021-12-07 DIAGNOSIS — Z23 NEEDS FLU SHOT: ICD-10-CM

## 2021-12-07 DIAGNOSIS — L72.9 INFECTED CYST OF SKIN: ICD-10-CM

## 2021-12-07 DIAGNOSIS — R63.5 WEIGHT GAIN: ICD-10-CM

## 2021-12-07 DIAGNOSIS — L29.9 ITCHING: ICD-10-CM

## 2021-12-07 DIAGNOSIS — L08.9 INFECTED CYST OF SKIN: ICD-10-CM

## 2021-12-07 DIAGNOSIS — R53.83 FATIGUE, UNSPECIFIED TYPE: ICD-10-CM

## 2021-12-07 LAB
ALBUMIN SERPL-MCNC: 4.1 G/DL (ref 3.5–5.2)
ALBUMIN/GLOB SERPL: 1.1 G/DL
ALP SERPL-CCNC: 125 U/L (ref 39–117)
ALT SERPL W P-5'-P-CCNC: 8 U/L (ref 1–33)
ANION GAP SERPL CALCULATED.3IONS-SCNC: 9 MMOL/L (ref 5–15)
AST SERPL-CCNC: 14 U/L (ref 1–32)
BASOPHILS # BLD AUTO: 0.03 10*3/MM3 (ref 0–0.2)
BASOPHILS NFR BLD AUTO: 0.4 % (ref 0–1.5)
BILIRUB SERPL-MCNC: 0.5 MG/DL (ref 0–1.2)
BUN SERPL-MCNC: 8 MG/DL (ref 6–20)
BUN/CREAT SERPL: 14.5 (ref 7–25)
CALCIUM SPEC-SCNC: 8.9 MG/DL (ref 8.6–10.5)
CHLORIDE SERPL-SCNC: 104 MMOL/L (ref 98–107)
CHOLEST SERPL-MCNC: 156 MG/DL (ref 0–200)
CO2 SERPL-SCNC: 24 MMOL/L (ref 22–29)
CREAT SERPL-MCNC: 0.55 MG/DL (ref 0.57–1)
DEPRECATED RDW RBC AUTO: 46.5 FL (ref 37–54)
EOSINOPHIL # BLD AUTO: 0.2 10*3/MM3 (ref 0–0.4)
EOSINOPHIL NFR BLD AUTO: 2.4 % (ref 0.3–6.2)
ERYTHROCYTE [DISTWIDTH] IN BLOOD BY AUTOMATED COUNT: 14.5 % (ref 12.3–15.4)
GFR SERPL CREATININE-BSD FRML MDRD: 122 ML/MIN/1.73
GLOBULIN UR ELPH-MCNC: 3.7 GM/DL
GLUCOSE SERPL-MCNC: 88 MG/DL (ref 65–99)
HBA1C MFR BLD: 5.6 % (ref 4.8–5.6)
HCT VFR BLD AUTO: 45.1 % (ref 34–46.6)
HCV AB SER DONR QL: NORMAL
HDLC SERPL-MCNC: 33 MG/DL (ref 40–60)
HGB BLD-MCNC: 14.2 G/DL (ref 12–15.9)
IMM GRANULOCYTES # BLD AUTO: 0.02 10*3/MM3 (ref 0–0.05)
IMM GRANULOCYTES NFR BLD AUTO: 0.2 % (ref 0–0.5)
LDLC SERPL CALC-MCNC: 101 MG/DL (ref 0–100)
LDLC/HDLC SERPL: 3.01 {RATIO}
LYMPHOCYTES # BLD AUTO: 2.41 10*3/MM3 (ref 0.7–3.1)
LYMPHOCYTES NFR BLD AUTO: 29.2 % (ref 19.6–45.3)
MCH RBC QN AUTO: 27.8 PG (ref 26.6–33)
MCHC RBC AUTO-ENTMCNC: 31.5 G/DL (ref 31.5–35.7)
MCV RBC AUTO: 88.3 FL (ref 79–97)
MONOCYTES # BLD AUTO: 0.68 10*3/MM3 (ref 0.1–0.9)
MONOCYTES NFR BLD AUTO: 8.2 % (ref 5–12)
NEUTROPHILS NFR BLD AUTO: 4.91 10*3/MM3 (ref 1.7–7)
NEUTROPHILS NFR BLD AUTO: 59.6 % (ref 42.7–76)
NRBC BLD AUTO-RTO: 0 /100 WBC (ref 0–0.2)
PLATELET # BLD AUTO: 352 10*3/MM3 (ref 140–450)
PMV BLD AUTO: 9.7 FL (ref 6–12)
POTASSIUM SERPL-SCNC: 4.5 MMOL/L (ref 3.5–5.2)
PROT SERPL-MCNC: 7.8 G/DL (ref 6–8.5)
RBC # BLD AUTO: 5.11 10*6/MM3 (ref 3.77–5.28)
SODIUM SERPL-SCNC: 137 MMOL/L (ref 136–145)
TRIGL SERPL-MCNC: 118 MG/DL (ref 0–150)
TSH SERPL DL<=0.05 MIU/L-ACNC: 1.27 UIU/ML (ref 0.27–4.2)
VLDLC SERPL-MCNC: 22 MG/DL (ref 5–40)
WBC NRBC COR # BLD: 8.25 10*3/MM3 (ref 3.4–10.8)

## 2021-12-07 PROCEDURE — 83036 HEMOGLOBIN GLYCOSYLATED A1C: CPT | Performed by: NURSE PRACTITIONER

## 2021-12-07 PROCEDURE — 90686 IIV4 VACC NO PRSV 0.5 ML IM: CPT | Performed by: NURSE PRACTITIONER

## 2021-12-07 PROCEDURE — G0008 ADMIN INFLUENZA VIRUS VAC: HCPCS | Performed by: NURSE PRACTITIONER

## 2021-12-07 PROCEDURE — 85025 COMPLETE CBC W/AUTO DIFF WBC: CPT | Performed by: NURSE PRACTITIONER

## 2021-12-07 PROCEDURE — 36415 COLL VENOUS BLD VENIPUNCTURE: CPT | Performed by: NURSE PRACTITIONER

## 2021-12-07 PROCEDURE — 1159F MED LIST DOCD IN RCRD: CPT | Performed by: NURSE PRACTITIONER

## 2021-12-07 PROCEDURE — 86803 HEPATITIS C AB TEST: CPT | Performed by: NURSE PRACTITIONER

## 2021-12-07 PROCEDURE — 1126F AMNT PAIN NOTED NONE PRSNT: CPT | Performed by: NURSE PRACTITIONER

## 2021-12-07 PROCEDURE — 80053 COMPREHEN METABOLIC PANEL: CPT | Performed by: NURSE PRACTITIONER

## 2021-12-07 PROCEDURE — G0439 PPPS, SUBSEQ VISIT: HCPCS | Performed by: NURSE PRACTITIONER

## 2021-12-07 PROCEDURE — 84443 ASSAY THYROID STIM HORMONE: CPT | Performed by: NURSE PRACTITIONER

## 2021-12-07 PROCEDURE — 80061 LIPID PANEL: CPT | Performed by: NURSE PRACTITIONER

## 2021-12-07 RX ORDER — LEVOFLOXACIN 500 MG/1
500 TABLET, FILM COATED ORAL DAILY
Qty: 7 TABLET | Refills: 0 | Status: SHIPPED | OUTPATIENT
Start: 2021-12-07 | End: 2022-04-20

## 2021-12-07 RX ORDER — HYDROXYZINE HYDROCHLORIDE 25 MG/1
25 TABLET, FILM COATED ORAL 3 TIMES DAILY PRN
Qty: 90 TABLET | Refills: 0 | Status: SHIPPED | OUTPATIENT
Start: 2021-12-07 | End: 2022-04-20

## 2021-12-07 NOTE — ASSESSMENT & PLAN NOTE
The patient is here for health maintenance visit.  Currently, the patient consumes a healthy diet and has an adequate exercise regimen.  Screening lab work is ordered.  Immunizations were reviewed today.  Advice and education was given regarding nutrition, aerobic exercise, routine dental evaluations, routine eye exams, reproductive health, cardiovascular risk reduction, sunscreen use, self skin examination (annual dermatology evaluations) and seatbelt use (general overall safety).  Further recommendations will be given if needed after lab evaluation.  Annual wellness evaluation is recommended.\

## 2021-12-07 NOTE — PROGRESS NOTES
The ABCs of the Annual Wellness Visit  Initial Medicare Wellness Visit    Chief Complaint   Patient presents with   • Medicare Wellness-Initial Visit     Subjective   History of Present Illness:  Desmond Bishop is a 41 y.o. female who presents for an Initial Medicare Wellness Visit. Patient complains of fatigue and interittent nausea for about 3 months. Complaints of intermittent itching of body that occurs with chronic condition hidrenatitis suppurativa.    The following portions of the patient's history were reviewed and   updated as appropriate: allergies, current medications, past family history, past medical history, past social history, past surgical history and problem list.   Past papsmear normal.She is due Has not had 3 years. She decline referral to gynecologist at this time.  Reports had mammogram years ago due to symptoms and negative. Declines mammogram order today.   Current everyday smoker and tried nicotine patches and Wellbutrin and bad selma effects. She does not want to try Chantix due to possible side effects.  Denies family history of cancer.     Compared to one year ago, the patient feels her physical   health is better.    Compared to one year ago, the patient feels her mental   health is better.    Recent Hospitalizations:  She was not admitted to the hospital during the last year.       Current Medical Providers:  Patient Care Team:  Agnieszka Islas APRN as PCP - General (Nurse Practitioner)  Sae Vigil MD as Consulting Physician (General Surgery)    Outpatient Medications Prior to Visit   Medication Sig Dispense Refill   • buPROPion (Zyban) 150 MG 12 hr tablet Take 150 mg by mouth Daily. 30 tablet 2   • mupirocin (Bactroban) 2 % cream Apply 1 application topically to the appropriate area as directed 3 (Three) Times a Day. 30 g 1     No facility-administered medications prior to visit.       No opioid medication identified on active medication list. I have reviewed chart for other  "potential  high risk medication/s and harmful drug interactions in the elderly.          Aspirin is not on active medication list.  Aspirin use is not indicated based on review of current medical condition/s. Risk of harm outweighs potential benefits.  .    Patient Active Problem List   Diagnosis   • Encounter for smoking cessation counseling   • Hidradenitis suppurativa   • Infected cyst of skin   • Initial Medicare annual wellness visit     Advance Care Planning  Advance Directive is not on file.  ACP discussion was held with the patient during this visit. Patient does not have an advance directive, information provided.          Objective       Vitals:    12/07/21 0857   BP: 118/74   Pulse: 72   Resp: 16   Temp: 97.8 °F (36.6 °C)   SpO2: 98%   Weight: 84.6 kg (186 lb 6.4 oz)   Height: 160 cm (63\")   PainSc: 0-No pain     BMI Readings from Last 1 Encounters:   12/07/21 33.02 kg/m²   BMI is above normal parameters. Recommendations include: educational material and exercise counseling    Does the patient have evidence of cognitive impairment? No    Physical Exam  Vitals and nursing note reviewed.   Constitutional:       General: She is not in acute distress.     Appearance: Normal appearance.   HENT:      Head: Normocephalic.      Right Ear: External ear normal.      Left Ear: External ear normal.      Nose: Nose normal.      Mouth/Throat:      Mouth: Mucous membranes are moist.      Pharynx: Oropharynx is clear.   Eyes:      Extraocular Movements: Extraocular movements intact.      Conjunctiva/sclera: Conjunctivae normal.      Pupils: Pupils are equal, round, and reactive to light.   Neck:      Vascular: No carotid bruit.   Cardiovascular:      Rate and Rhythm: Normal rate and regular rhythm.      Pulses: Normal pulses.      Heart sounds: Normal heart sounds.   Pulmonary:      Effort: Pulmonary effort is normal.      Breath sounds: Normal breath sounds.   Abdominal:      General: Bowel sounds are normal. There is " no distension.      Palpations: Abdomen is soft.   Musculoskeletal:      Cervical back: Normal range of motion and neck supple.      Right lower leg: No edema.      Left lower leg: No edema.   Skin:     General: Skin is warm and dry.      Findings: Acne present. No rash.          Neurological:      Mental Status: She is alert and oriented to person, place, and time.      Cranial Nerves: No cranial nerve deficit.      Sensory: No sensory deficit.      Motor: No weakness.      Coordination: Coordination normal.      Gait: Gait normal.      Deep Tendon Reflexes: Reflexes normal.   Psychiatric:         Mood and Affect: Mood normal.         Behavior: Behavior normal.         Thought Content: Thought content normal.         Judgment: Judgment normal.               HEALTH RISK ASSESSMENT    Smoking Status:  Social History     Tobacco Use   Smoking Status Current Every Day Smoker   • Packs/day: 0.50   • Years: 10.00   • Pack years: 5.00   • Types: Cigarettes   • Start date: 9/3/2008   Smokeless Tobacco Never Used     Alcohol Consumption:  Social History     Substance and Sexual Activity   Alcohol Use No     Fall Risk Screen:    STEADI Fall Risk Assessment has not been completed.    Depression Screen:   PHQ-2/PHQ-9 Depression Screening 9/3/2021   Little interest or pleasure in doing things 0   Feeling down, depressed, or hopeless 0   Total Score 0       Health Habits and Functional and Cognitive Screening:  No flowsheet data found.    Age-appropriate Screening Schedule:  Refer to the list below for future screening recommendations based on patient's age, sex and/or medical conditions. Orders for these recommended tests are listed in the plan section. The patient has been provided with a written plan.    Health Maintenance   Topic Date Due   • TDAP/TD VACCINES (1 - Tdap) Never done   • PAP SMEAR  Never done   • INFLUENZA VACCINE  Completed            Assessment/Plan   CMS Preventative Services Quick Reference  Risk Factors  Identified During Encounter  Cardiovascular Disease  Depression/Dysphoria  Immunizations Discussed/Encouraged (specific Immunizations; Influenza  Inadequate Social Support, Isolation, Loneliness, Lack of Transportation, Financial Difficulties, or Caregiver Stress   Inactivity/Sedentary  Obesity/Overweight   Tobacco Use/Dependance (use dotphrase .tobaccocessation for documentation)  The above risks/problems have been discussed with the patient.  Follow up actions/plans if indicated are seen below in the Assessment/Plan Section.  Pertinent information has been shared with the patient in the After Visit Summary.    Diagnoses and all orders for this visit:    1. Initial Medicare annual wellness visit (Primary)  Assessment & Plan:  The patient is here for health maintenance visit.  Currently, the patient consumes a healthy diet and has an adequate exercise regimen.  Screening lab work is ordered.  Immunizations were reviewed today.  Advice and education was given regarding nutrition, aerobic exercise, routine dental evaluations, routine eye exams, reproductive health, cardiovascular risk reduction, sunscreen use, self skin examination (annual dermatology evaluations) and seatbelt use (general overall safety).  Further recommendations will be given if needed after lab evaluation.  Annual wellness evaluation is recommended.\    Orders:  -     CBC & Differential  -     Comprehensive Metabolic Panel  -     Hemoglobin A1c  -     Hepatitis C Antibody  -     Lipid Panel  -     TSH Rfx On Abnormal To Free T4    2. Class 1 obesity due to excess calories without serious comorbidity with body mass index (BMI) of 31.0 to 31.9 in adult  -     Hemoglobin A1c  -     Lipid Panel  -     TSH Rfx On Abnormal To Free T4    3. Needs flu shot  -     FluLaval/Fluarix/Fluzone >6 Months ([id number])    4. Itching  -     hydrOXYzine (ATARAX) 25 MG tablet; Take 1 tablet by mouth 3 (Three) Times a Day As Needed for Itching.  Dispense: 90 tablet;  Refill: 0    5. Infected cyst of skin  -     levoFLOXacin (Levaquin) 500 MG tablet; Take 1 tablet by mouth Daily.  Dispense: 7 tablet; Refill: 0    6. Fatigue, unspecified type  -     CBC & Differential  -     Comprehensive Metabolic Panel  -     Hemoglobin A1c  -     TSH Rfx On Abnormal To Free T4    7. Nausea  -     CBC & Differential  -     Comprehensive Metabolic Panel  -     Hemoglobin A1c  -     TSH Rfx On Abnormal To Free T4    8. Weight gain  -     Hemoglobin A1c    9. Sedentary lifestyle  -     Hemoglobin A1c  Labs today  Discussed diet and exercise and to exercise at least 3 times weekly for 30-45 minutes.  Flu shot due and patient agreeable to receive  Suggested mammogram and patient declines  Suggested gynecology referral for needed pap and patient refuses  Reviewed recent wound culture results with patient and she states she did not get recent Bactrim because it never works. Start Levaquin instead as it is susceptible and sent to pharmacy for posterior neck infection.  Will notify patient upcoming with lab results  Follow Up:  Return in about 1 year (around 12/7/2022) for Medicare Wellness.     An After Visit Summary and PPPS were made available to the patient.        I spent 30 minutes caring for Yahidria on this date of service. This time includes time spent by me in the following activities:preparing for the visit, obtaining and/or reviewing a separately obtained history, performing a medically appropriate examination and/or evaluation , counseling and educating the patient/family/caregiver, ordering medications, tests, or procedures, referring and communicating with other health care professionals  and documenting information in the medical record

## 2021-12-07 NOTE — PROGRESS NOTES
The ABCs of the Annual Wellness Visit  Initial Medicare Wellness Visit    Chief Complaint   Patient presents with   • Annual Exam     Subjective   History of Present Illness:  Desmond Bishop is a 41 y.o. female who presents for an Initial Medicare Wellness Visit. Patient complains of fatigue and interittent nausea for about 3 months. Complaints of intermittent itching of body that occurs with chronic condition hidrenatitis suppurativa.    The following portions of the patient's history were reviewed and   updated as appropriate: allergies, current medications, past family history, past medical history, past social history, past surgical history and problem list.   Past papsmear normal.She is due Has not had 3 years. She decline referral to gynecologist at this time.  Reports had mammogram years ago due to symptoms and negative. Declines mammogram order today.   Current everyday smoker and tried nicotine patches and Wellbutrin and bad selma effects. She does not want to try Chantix due to possible side effects.  Denies family history of cancer.     Compared to one year ago, the patient feels her physical   health is better.    Compared to one year ago, the patient feels her mental   health is better.    Recent Hospitalizations:  She was not admitted to the hospital during the last year.       Current Medical Providers:  Patient Care Team:  Agnieszka Islas APRN as PCP - General (Nurse Practitioner)  Sae Vigil MD as Consulting Physician (General Surgery)    Outpatient Medications Prior to Visit   Medication Sig Dispense Refill   • buPROPion (Zyban) 150 MG 12 hr tablet Take 150 mg by mouth Daily. 30 tablet 2   • mupirocin (Bactroban) 2 % cream Apply 1 application topically to the appropriate area as directed 3 (Three) Times a Day. 30 g 1     No facility-administered medications prior to visit.       No opioid medication identified on active medication list. I have reviewed chart for other potential  high risk  "medication/s and harmful drug interactions in the elderly.          Aspirin is not on active medication list.  Aspirin use is not indicated based on review of current medical condition/s. Risk of harm outweighs potential benefits.  .    Patient Active Problem List   Diagnosis   • Encounter for smoking cessation counseling   • Hidradenitis suppurativa   • Infected cyst of skin   • Initial Medicare annual wellness visit     Advance Care Planning  Advance Directive is not on file.  ACP discussion was held with the patient during this visit. Patient does not have an advance directive, information provided.          Objective       Vitals:    12/07/21 0857   BP: 118/74   Pulse: 72   Resp: 16   Temp: 97.8 °F (36.6 °C)   SpO2: 98%   Weight: 84.6 kg (186 lb 6.4 oz)   Height: 160 cm (63\")   PainSc: 0-No pain     BMI Readings from Last 1 Encounters:   12/07/21 33.02 kg/m²   BMI is above normal parameters. Recommendations include: educational material and exercise counseling    Does the patient have evidence of cognitive impairment? No    Physical Exam          HEALTH RISK ASSESSMENT    Smoking Status:  Social History     Tobacco Use   Smoking Status Current Every Day Smoker   • Packs/day: 0.50   • Years: 10.00   • Pack years: 5.00   • Types: Cigarettes   • Start date: 9/3/2008   Smokeless Tobacco Never Used     Alcohol Consumption:  Social History     Substance and Sexual Activity   Alcohol Use No     Fall Risk Screen:    LOVE Fall Risk Assessment has not been completed.    Depression Screen:   PHQ-2/PHQ-9 Depression Screening 9/3/2021   Little interest or pleasure in doing things 0   Feeling down, depressed, or hopeless 0   Total Score 0       Health Habits and Functional and Cognitive Screening:  No flowsheet data found.    Age-appropriate Screening Schedule:  Refer to the list below for future screening recommendations based on patient's age, sex and/or medical conditions. Orders for these recommended tests are listed in " the plan section. The patient has been provided with a written plan.    Health Maintenance   Topic Date Due   • TDAP/TD VACCINES (1 - Tdap) Never done   • PAP SMEAR  Never done   • INFLUENZA VACCINE  Never done            Assessment/Plan   CMS Preventative Services Quick Reference  Risk Factors Identified During Encounter  Cardiovascular Disease  Depression/Dysphoria  Immunizations Discussed/Encouraged (specific Immunizations; Influenza  Inadequate Social Support, Isolation, Loneliness, Lack of Transportation, Financial Difficulties, or Caregiver Stress   Inactivity/Sedentary  Obesity/Overweight   Tobacco Use/Dependance (use dotphrase .tobaccocessation for documentation)  The above risks/problems have been discussed with the patient.  Follow up actions/plans if indicated are seen below in the Assessment/Plan Section.  Pertinent information has been shared with the patient in the After Visit Summary.    Diagnoses and all orders for this visit:    1. Initial Medicare annual wellness visit (Primary)  Assessment & Plan:  The patient is here for health maintenance visit.  Currently, the patient consumes a healthy diet and has an adequate exercise regimen.  Screening lab work is ordered.  Immunizations were reviewed today.  Advice and education was given regarding nutrition, aerobic exercise, routine dental evaluations, routine eye exams, reproductive health, cardiovascular risk reduction, sunscreen use, self skin examination (annual dermatology evaluations) and seatbelt use (general overall safety).  Further recommendations will be given if needed after lab evaluation.  Annual wellness evaluation is recommended.\          Follow Up:  Return in about 1 year (around 12/7/2022) for Medicare Wellness.     An After Visit Summary and PPPS were made available to the patient.        I spent 30 minutes caring for Desmond on this date of service. This time includes time spent by me in the following activities:preparing for the  visit, obtaining and/or reviewing a separately obtained history, performing a medically appropriate examination and/or evaluation , counseling and educating the patient/family/caregiver, ordering medications, tests, or procedures, referring and communicating with other health care professionals  and documenting information in the medical record

## 2022-04-20 ENCOUNTER — OFFICE VISIT (OUTPATIENT)
Dept: FAMILY MEDICINE CLINIC | Facility: CLINIC | Age: 42
End: 2022-04-20

## 2022-04-20 VITALS
TEMPERATURE: 98.6 F | SYSTOLIC BLOOD PRESSURE: 124 MMHG | HEIGHT: 63 IN | HEART RATE: 69 BPM | WEIGHT: 183 LBS | BODY MASS INDEX: 32.43 KG/M2 | RESPIRATION RATE: 18 BRPM | OXYGEN SATURATION: 98 % | DIASTOLIC BLOOD PRESSURE: 88 MMHG

## 2022-04-20 DIAGNOSIS — M25.562 ACUTE PAIN OF LEFT KNEE: Primary | ICD-10-CM

## 2022-04-20 PROCEDURE — 99213 OFFICE O/P EST LOW 20 MIN: CPT | Performed by: NURSE PRACTITIONER

## 2022-04-20 RX ORDER — IBUPROFEN 600 MG/1
600 TABLET ORAL EVERY 8 HOURS PRN
Qty: 90 TABLET | Refills: 0 | Status: SHIPPED | OUTPATIENT
Start: 2022-04-20

## 2022-04-21 PROBLEM — M25.562 ACUTE PAIN OF LEFT KNEE: Status: ACTIVE | Noted: 2022-04-21

## 2022-04-21 NOTE — ASSESSMENT & PLAN NOTE
X-ray today in office  Knee sleeve/brace recommended  Tylenol/Ibuprofen PRN as directed  Ice therapy PRN 15-20 minutes TID  Elevate extremity  RTO or call PRN persistent or worsening pain

## 2022-04-21 NOTE — PROGRESS NOTES
"Chief Complaint  Knee Pain (bilateral)    Sheri Bishop presents to [facility]  Knee Pain   The incident occurred 3 to 5 days ago. Incident location: No injury  There was no injury mechanism. The pain is present in the left knee and right knee. The quality of the pain is described as aching. The pain is moderate. The pain has been intermittent since onset. Pertinent negatives include no inability to bear weight, loss of motion, loss of sensation, muscle weakness, numbness or tingling. She reports no foreign bodies present. The symptoms are aggravated by weight bearing. She has tried elevation and rest for the symptoms. The treatment provided no relief.       Objective   Vital Signs:   /88   Pulse 69   Temp 98.6 °F (37 °C)   Resp 18   Ht 160 cm (63\")   Wt 83 kg (183 lb)   SpO2 98%   BMI 32.42 kg/m²            Physical Exam  Vitals and nursing note reviewed.   Constitutional:       General: She is not in acute distress.     Appearance: Normal appearance.   HENT:      Head: Normocephalic.      Right Ear: External ear normal.      Left Ear: External ear normal.      Nose: Nose normal.   Eyes:      Extraocular Movements: Extraocular movements intact.      Conjunctiva/sclera: Conjunctivae normal.      Pupils: Pupils are equal, round, and reactive to light.   Cardiovascular:      Rate and Rhythm: Normal rate and regular rhythm.      Heart sounds: Normal heart sounds.   Pulmonary:      Effort: Pulmonary effort is normal.      Breath sounds: Normal breath sounds.   Musculoskeletal:         General: No swelling or tenderness.      Right knee: Crepitus present. No swelling or erythema. No tenderness.      Left knee: No swelling or erythema. No tenderness.      Right lower leg: No edema.      Left lower leg: No edema.   Skin:     General: Skin is warm and dry.   Neurological:      Mental Status: She is alert and oriented to person, place, and time. "   Psychiatric:         Mood and Affect: Mood normal.         Behavior: Behavior normal.         Thought Content: Thought content normal.         Judgment: Judgment normal.        Result Review :                 Assessment and Plan    Diagnoses and all orders for this visit:    1. Acute pain of left knee (Primary)  Assessment & Plan:  X-ray today in office  Knee sleeve/brace recommended  Tylenol/Ibuprofen PRN as directed  Ice therapy PRN 15-20 minutes TID  Elevate extremity  RTO or call PRN persistent or worsening pain    Orders:  -     XR Knee 3 View Left  -     ibuprofen (ADVIL,MOTRIN) 600 MG tablet; Take 1 tablet by mouth Every 8 (Eight) Hours As Needed for Moderate Pain .  Dispense: 90 tablet; Refill: 0      Follow Up   Return if symptoms worsen or fail to improve.  Patient was given instructions and counseling regarding her condition or for health maintenance advice. Please see specific information pulled into the AVS if appropriate.

## 2022-09-19 ENCOUNTER — TELEPHONE (OUTPATIENT)
Dept: FAMILY MEDICINE CLINIC | Facility: CLINIC | Age: 42
End: 2022-09-19

## 2022-09-19 NOTE — TELEPHONE ENCOUNTER
HUB TO READ    ATTEMPTED TO CALL PT AT NUMBER LISTED. NO ANSWER LEFT VM ABOUT NO SHOW POLICY. LETTER SENT OUT

## 2023-02-14 ENCOUNTER — TELEPHONE (OUTPATIENT)
Dept: FAMILY MEDICINE CLINIC | Facility: CLINIC | Age: 43
End: 2023-02-14

## 2023-02-14 ENCOUNTER — OFFICE VISIT (OUTPATIENT)
Dept: FAMILY MEDICINE CLINIC | Facility: CLINIC | Age: 43
End: 2023-02-14
Payer: MEDICARE

## 2023-02-14 VITALS
SYSTOLIC BLOOD PRESSURE: 134 MMHG | WEIGHT: 176.4 LBS | BODY MASS INDEX: 31.25 KG/M2 | OXYGEN SATURATION: 98 % | TEMPERATURE: 97.5 F | HEIGHT: 63 IN | HEART RATE: 96 BPM | DIASTOLIC BLOOD PRESSURE: 80 MMHG

## 2023-02-14 DIAGNOSIS — G89.29 OTHER CHRONIC PAIN: Primary | ICD-10-CM

## 2023-02-14 PROBLEM — Z00.00 ENCOUNTER FOR GENERAL ADULT MEDICAL EXAMINATION WITHOUT ABNORMAL FINDINGS: Status: ACTIVE | Noted: 2022-12-29

## 2023-02-14 PROBLEM — Z97.3 WEARS EYEGLASSES: Status: ACTIVE | Noted: 2022-12-29

## 2023-02-14 PROBLEM — L02.91 ABSCESS: Status: ACTIVE | Noted: 2022-12-29

## 2023-02-14 PROBLEM — M54.2 NECK PAIN: Status: ACTIVE | Noted: 2020-01-07

## 2023-02-14 PROBLEM — F41.0 PANIC ATTACK: Status: ACTIVE | Noted: 2022-12-29

## 2023-02-14 PROBLEM — G43.909 MIGRAINE HEADACHE: Status: ACTIVE | Noted: 2020-01-07

## 2023-02-14 PROBLEM — G44.009 HEADACHES, CLUSTER: Status: ACTIVE | Noted: 2022-12-29

## 2023-02-14 PROBLEM — M54.9 BACK PAIN: Status: ACTIVE | Noted: 2022-12-29

## 2023-02-14 PROBLEM — F41.9 ANXIETY: Status: ACTIVE | Noted: 2020-01-07

## 2023-02-14 PROCEDURE — 99213 OFFICE O/P EST LOW 20 MIN: CPT | Performed by: PHYSICIAN ASSISTANT

## 2023-02-14 RX ORDER — BUSPIRONE HYDROCHLORIDE 10 MG/1
TABLET ORAL
COMMUNITY
Start: 2023-01-03 | End: 2023-02-14

## 2023-02-14 RX ORDER — SUMATRIPTAN 100 MG/1
TABLET, FILM COATED ORAL SEE ADMIN INSTRUCTIONS
COMMUNITY
Start: 2023-01-17

## 2023-02-14 RX ORDER — GINSENG 100 MG
CAPSULE ORAL
COMMUNITY
Start: 2023-01-02 | End: 2023-02-14

## 2023-02-14 RX ORDER — ALBUTEROL SULFATE 90 UG/1
2 AEROSOL, METERED RESPIRATORY (INHALATION) EVERY 4 HOURS PRN
Qty: 18 G | Refills: 1 | Status: SHIPPED | OUTPATIENT
Start: 2023-02-14

## 2023-02-14 RX ORDER — MELOXICAM 7.5 MG/1
7.5 TABLET ORAL DAILY
Qty: 30 TABLET | Refills: 5 | Status: SHIPPED | OUTPATIENT
Start: 2023-02-14

## 2023-02-14 RX ORDER — AMITRIPTYLINE HYDROCHLORIDE 25 MG/1
25 TABLET, FILM COATED ORAL 2 TIMES DAILY
Qty: 60 TABLET | Refills: 1 | Status: SHIPPED | OUTPATIENT
Start: 2023-02-14

## 2023-02-14 RX ORDER — CLINDAMYCIN HYDROCHLORIDE 150 MG/1
CAPSULE ORAL
COMMUNITY
Start: 2023-01-02 | End: 2023-02-14

## 2023-02-14 NOTE — PROGRESS NOTES
"     New Patient Office Visit      Date: 2023   Patient Name: Desmond Bishop  : 1980   MRN: [id number]     Chief Complaint:    Chief Complaint   Patient presents with   • Generalized Body Aches     Pt states she has been aching, her face and her head,  Pt states it been happening for decades but it getting worse   • Shortness of Breath       History of Present Illness: Desmond Bishop is a 42 y.o. female who is here today to establish care.  Patient complains of SOA, started \"a long time ago\", doesn't happen all the time but intermittently.  She states she has pain all over her body and her skin burns.  She wants pain medication.   She does see neurology for chronic headaches and migraines.    Subjective      Review of Systems:   Review of Systems   Constitutional: Negative for fatigue and fever.   HENT: Negative for trouble swallowing.    Eyes: Negative for visual disturbance.   Respiratory: Positive for shortness of breath. Negative for wheezing.    Cardiovascular: Negative for chest pain and leg swelling.   Musculoskeletal: Positive for arthralgias and myalgias.        Past Medical History: History reviewed. No pertinent past medical history.    Past Surgical History:   Past Surgical History:   Procedure Laterality Date   • CHOLECYSTECTOMY     • FRACTURE SURGERY  2013    Surgery in my knee and my hip   • HIP SURGERY     • LYMPH NODE BIOPSY     • TUBAL ABDOMINAL LIGATION         Family History: History reviewed. No pertinent family history.    Social History:   Social History     Socioeconomic History   • Marital status: Single   Tobacco Use   • Smoking status: Every Day     Packs/day: 0.50     Years: 10.00     Pack years: 5.00     Types: Cigarettes     Start date: 9/3/2008     Passive exposure: Never   • Smokeless tobacco: Never   Substance and Sexual Activity   • Alcohol use: No   • Drug use: No   • Sexual activity: Defer       Medications:     Current Outpatient Medications:   •  ibuprofen " "(ADVIL,MOTRIN) 600 MG tablet, Take 1 tablet by mouth Every 8 (Eight) Hours As Needed for Moderate Pain ., Disp: 90 tablet, Rfl: 0  •  SUMAtriptan (IMITREX) 100 MG tablet, Take  by mouth See Admin Instructions. Take 1 tablet by mouth at onset of headache, may repeat one time in 2 hours as needed, Disp: , Rfl:   •  albuterol sulfate  (90 Base) MCG/ACT inhaler, Inhale 2 puffs Every 4 (Four) Hours As Needed for Shortness of Air., Disp: 18 g, Rfl: 1  •  amitriptyline (ELAVIL) 25 MG tablet, Take 1 tablet by mouth 2 (Two) Times a Day. For burning pain/sensation, Disp: 60 tablet, Rfl: 1  •  meclizine (ANTIVERT) 25 MG tablet, Take 1 tablet by mouth 3 (Three) Times a Day As Needed for Dizziness for up to 10 days., Disp: 30 tablet, Rfl: 0  •  meloxicam (Mobic) 7.5 MG tablet, Take 1 tablet by mouth Daily. For body pain, Disp: 30 tablet, Rfl: 5    Allergies:   No Known Allergies    Objective     Vital Signs:   Vitals:    02/14/23 1035   BP: 134/80   Pulse: 96   Temp: 97.5 °F (36.4 °C)   TempSrc: Infrared   SpO2: 98%   Weight: 80 kg (176 lb 6.4 oz)   Height: 160 cm (63\")   PainSc: 10-Worst pain ever     Body mass index is 31.25 kg/m².   BMI is >= 30 and <35. (Class 1 Obesity). The following options were offered after discussion;: weight loss educational material (shared in after visit summary)      Physical Exam:   Physical Exam  Nursing note reviewed.   Constitutional:       Appearance: Normal appearance.   Cardiovascular:      Rate and Rhythm: Normal rate and regular rhythm.   Pulmonary:      Effort: Pulmonary effort is normal.      Breath sounds: Normal breath sounds.   Neurological:      Mental Status: She is alert.            Assessment / Plan      Assessment/Plan:   Diagnoses and all orders for this visit:    1. Other chronic pain (Primary)  -     amitriptyline (ELAVIL) 25 MG tablet; Take 1 tablet by mouth 2 (Two) Times a Day. For burning pain/sensation  Dispense: 60 tablet; Refill: 1  -     Ambulatory Referral to " Pain Management    Other orders  -     meloxicam (Mobic) 7.5 MG tablet; Take 1 tablet by mouth Daily. For body pain  Dispense: 30 tablet; Refill: 5  -     albuterol sulfate  (90 Base) MCG/ACT inhaler; Inhale 2 puffs Every 4 (Four) Hours As Needed for Shortness of Air.  Dispense: 18 g; Refill: 1         1. Physical exam is brief, due to the fact that patient states she does not like to be touched.  She states she is here to get pain medication for her chronic pain and the burning pain that she has in various body parts.  Attempts at getting more history than this is not successful.  Discussion on working up chronic pain with labs and imaging and working up shortness of breath with cardiac eval and pulmonary evaluations are declined by patient.  I discussed that I can refer her to pain management, but I do see that her neurologist has done this as well.  I can try her on amitriptyline for the burning sensations that she has and meloxicam to see if this will help the arthralgias and myalgias.  Patient is not particularly happy that I will not prescribe narcotic pain medications however I reiterate that she would need to see pain management for chronic pain management that we do not do this here.      Follow Up:   No follow-ups on file.    Evangelina Pierre PA-C   [facility]

## 2023-02-14 NOTE — TELEPHONE ENCOUNTER
I have read through her ER reports and her recent visit with neurology. I don't think anything I can prescribe is going to help her, neurologist has recommended pain management and she has referral in her chart from him and myself, we will get this set up.

## 2023-02-14 NOTE — TELEPHONE ENCOUNTER
Caller: Desmond Bishop    Relationship: SELF    Best call back number: [phone]    What is the best time to reach you: ANY    Who are you requesting to speak with (clinical staff, provider,  specific staff member): MAR PEDERSON    What was the call regarding: PATIENT HAS TAKEN THE MEDICATION THAT WAS PRESCRIBED TODAY 02.14.23 FOR THE BURNING SENSATION. SHE SAYS THIS MEDICATION DOES NOT WORK FOR HER AND IS WANTING SOMETHING ELSE SENT TO THE [facility] AT [facility].     Do you require a callback: YES PATIENT IS WANTING TO KNOW WHAT SHE CAN TAKE.PLEASE CALL HER FOR QUESTIONS OR CONCERNS.

## 2023-02-20 NOTE — TELEPHONE ENCOUNTER
Hub to read :I have read through her ER reports and her recent visit with neurology. I don't think anything I can prescribe is going to help her, neurologist has recommended pain management and she has referral in her chart from him and myself, we will get this set up.    Lvm for pt to call back

## 2023-02-22 ENCOUNTER — HOSPITAL ENCOUNTER (EMERGENCY)
Facility: HOSPITAL | Age: 43
Discharge: LEFT BEFORE DISCHARGE | End: 2023-02-22
Attending: EMERGENCY MEDICINE | Admitting: EMERGENCY MEDICINE
Payer: MEDICARE

## 2023-02-22 ENCOUNTER — APPOINTMENT (OUTPATIENT)
Dept: GENERAL RADIOLOGY | Facility: HOSPITAL | Age: 43
End: 2023-02-22
Payer: MEDICARE

## 2023-02-22 ENCOUNTER — TELEPHONE (OUTPATIENT)
Dept: FAMILY MEDICINE CLINIC | Facility: CLINIC | Age: 43
End: 2023-02-22

## 2023-02-22 ENCOUNTER — APPOINTMENT (OUTPATIENT)
Dept: CT IMAGING | Facility: HOSPITAL | Age: 43
End: 2023-02-22
Payer: MEDICARE

## 2023-02-22 VITALS
HEIGHT: 63 IN | WEIGHT: 155 LBS | HEART RATE: 77 BPM | BODY MASS INDEX: 27.46 KG/M2 | RESPIRATION RATE: 20 BRPM | OXYGEN SATURATION: 100 % | DIASTOLIC BLOOD PRESSURE: 93 MMHG | SYSTOLIC BLOOD PRESSURE: 137 MMHG | TEMPERATURE: 98.3 F

## 2023-02-22 DIAGNOSIS — Z86.59 HISTORY OF PANIC ATTACKS: ICD-10-CM

## 2023-02-22 DIAGNOSIS — R42 DIZZINESS: Primary | ICD-10-CM

## 2023-02-22 DIAGNOSIS — Z86.69 HISTORY OF CLUSTER HEADACHE: ICD-10-CM

## 2023-02-22 DIAGNOSIS — Z86.59 HISTORY OF ANXIETY: ICD-10-CM

## 2023-02-22 DIAGNOSIS — Z86.69 HISTORY OF MIGRAINE HEADACHES: ICD-10-CM

## 2023-02-22 LAB
ALBUMIN SERPL-MCNC: 4 G/DL (ref 3.5–5.2)
ALBUMIN/GLOB SERPL: 1 G/DL
ALP SERPL-CCNC: 108 U/L (ref 39–117)
ALT SERPL W P-5'-P-CCNC: 16 U/L (ref 1–33)
ANION GAP SERPL CALCULATED.3IONS-SCNC: 8 MMOL/L (ref 5–15)
AST SERPL-CCNC: 19 U/L (ref 1–32)
BACTERIA UR QL AUTO: ABNORMAL /HPF
BASOPHILS # BLD AUTO: 0.03 10*3/MM3 (ref 0–0.2)
BASOPHILS NFR BLD AUTO: 0.3 % (ref 0–1.5)
BILIRUB SERPL-MCNC: 0.6 MG/DL (ref 0–1.2)
BILIRUB UR QL STRIP: NEGATIVE
BUN SERPL-MCNC: 8 MG/DL (ref 6–20)
BUN/CREAT SERPL: 16.7 (ref 7–25)
CALCIUM SPEC-SCNC: 8.7 MG/DL (ref 8.6–10.5)
CHLORIDE SERPL-SCNC: 100 MMOL/L (ref 98–107)
CLARITY UR: ABNORMAL
CO2 SERPL-SCNC: 26 MMOL/L (ref 22–29)
COLOR UR: YELLOW
CREAT SERPL-MCNC: 0.48 MG/DL (ref 0.57–1)
DEPRECATED RDW RBC AUTO: 43.4 FL (ref 37–54)
EGFRCR SERPLBLD CKD-EPI 2021: 122.2 ML/MIN/1.73
EOSINOPHIL # BLD AUTO: 0.07 10*3/MM3 (ref 0–0.4)
EOSINOPHIL NFR BLD AUTO: 0.8 % (ref 0.3–6.2)
ERYTHROCYTE [DISTWIDTH] IN BLOOD BY AUTOMATED COUNT: 13.8 % (ref 12.3–15.4)
GLOBULIN UR ELPH-MCNC: 3.9 GM/DL
GLUCOSE BLDC GLUCOMTR-MCNC: 100 MG/DL (ref 70–130)
GLUCOSE SERPL-MCNC: 113 MG/DL (ref 65–99)
GLUCOSE UR STRIP-MCNC: NEGATIVE MG/DL
HCT VFR BLD AUTO: 41 % (ref 34–46.6)
HGB BLD-MCNC: 13.3 G/DL (ref 12–15.9)
HGB UR QL STRIP.AUTO: ABNORMAL
HOLD SPECIMEN: NORMAL
HOLD SPECIMEN: NORMAL
HYALINE CASTS UR QL AUTO: ABNORMAL /LPF
IMM GRANULOCYTES # BLD AUTO: 0.02 10*3/MM3 (ref 0–0.05)
IMM GRANULOCYTES NFR BLD AUTO: 0.2 % (ref 0–0.5)
KETONES UR QL STRIP: NEGATIVE
LEUKOCYTE ESTERASE UR QL STRIP.AUTO: ABNORMAL
LYMPHOCYTES # BLD AUTO: 2.62 10*3/MM3 (ref 0.7–3.1)
LYMPHOCYTES NFR BLD AUTO: 29 % (ref 19.6–45.3)
MAGNESIUM SERPL-MCNC: 2 MG/DL (ref 1.6–2.6)
MCH RBC QN AUTO: 28.2 PG (ref 26.6–33)
MCHC RBC AUTO-ENTMCNC: 32.4 G/DL (ref 31.5–35.7)
MCV RBC AUTO: 86.9 FL (ref 79–97)
MONOCYTES # BLD AUTO: 0.61 10*3/MM3 (ref 0.1–0.9)
MONOCYTES NFR BLD AUTO: 6.8 % (ref 5–12)
NEUTROPHILS NFR BLD AUTO: 5.67 10*3/MM3 (ref 1.7–7)
NEUTROPHILS NFR BLD AUTO: 62.9 % (ref 42.7–76)
NITRITE UR QL STRIP: NEGATIVE
NRBC BLD AUTO-RTO: 0 /100 WBC (ref 0–0.2)
PH UR STRIP.AUTO: 6.5 [PH] (ref 5–8)
PLATELET # BLD AUTO: 319 10*3/MM3 (ref 140–450)
PMV BLD AUTO: 9.2 FL (ref 6–12)
POTASSIUM SERPL-SCNC: 3.5 MMOL/L (ref 3.5–5.2)
PROT SERPL-MCNC: 7.9 G/DL (ref 6–8.5)
PROT UR QL STRIP: ABNORMAL
RBC # BLD AUTO: 4.72 10*6/MM3 (ref 3.77–5.28)
RBC # UR STRIP: ABNORMAL /HPF
REF LAB TEST METHOD: ABNORMAL
SODIUM SERPL-SCNC: 134 MMOL/L (ref 136–145)
SP GR UR STRIP: 1.03 (ref 1–1.03)
SQUAMOUS #/AREA URNS HPF: ABNORMAL /HPF
TROPONIN T SERPL HS-MCNC: <6 NG/L
UROBILINOGEN UR QL STRIP: ABNORMAL
WBC # UR STRIP: ABNORMAL /HPF
WBC NRBC COR # BLD: 9.02 10*3/MM3 (ref 3.4–10.8)
WHOLE BLOOD HOLD COAG: NORMAL
WHOLE BLOOD HOLD SPECIMEN: NORMAL

## 2023-02-22 PROCEDURE — 81001 URINALYSIS AUTO W/SCOPE: CPT | Performed by: EMERGENCY MEDICINE

## 2023-02-22 PROCEDURE — 83735 ASSAY OF MAGNESIUM: CPT | Performed by: EMERGENCY MEDICINE

## 2023-02-22 PROCEDURE — 93005 ELECTROCARDIOGRAM TRACING: CPT | Performed by: EMERGENCY MEDICINE

## 2023-02-22 PROCEDURE — 70450 CT HEAD/BRAIN W/O DYE: CPT

## 2023-02-22 PROCEDURE — 82962 GLUCOSE BLOOD TEST: CPT

## 2023-02-22 PROCEDURE — 80053 COMPREHEN METABOLIC PANEL: CPT | Performed by: EMERGENCY MEDICINE

## 2023-02-22 PROCEDURE — 84484 ASSAY OF TROPONIN QUANT: CPT | Performed by: EMERGENCY MEDICINE

## 2023-02-22 PROCEDURE — 99283 EMERGENCY DEPT VISIT LOW MDM: CPT

## 2023-02-22 PROCEDURE — 85025 COMPLETE CBC W/AUTO DIFF WBC: CPT | Performed by: EMERGENCY MEDICINE

## 2023-02-22 PROCEDURE — 71045 X-RAY EXAM CHEST 1 VIEW: CPT

## 2023-02-22 PROCEDURE — 93005 ELECTROCARDIOGRAM TRACING: CPT

## 2023-02-22 RX ORDER — MECLIZINE HYDROCHLORIDE 25 MG/1
50 TABLET ORAL ONCE
Status: COMPLETED | OUTPATIENT
Start: 2023-02-22 | End: 2023-02-22

## 2023-02-22 RX ORDER — MECLIZINE HYDROCHLORIDE 25 MG/1
25 TABLET ORAL 3 TIMES DAILY PRN
Qty: 30 TABLET | Refills: 0 | Status: SHIPPED | OUTPATIENT
Start: 2023-02-22 | End: 2023-03-04

## 2023-02-22 RX ORDER — SODIUM CHLORIDE 0.9 % (FLUSH) 0.9 %
10 SYRINGE (ML) INJECTION AS NEEDED
Status: DISCONTINUED | OUTPATIENT
Start: 2023-02-22 | End: 2023-02-23 | Stop reason: HOSPADM

## 2023-02-22 RX ORDER — KETOROLAC TROMETHAMINE 15 MG/ML
15 INJECTION, SOLUTION INTRAMUSCULAR; INTRAVENOUS ONCE
Status: DISCONTINUED | OUTPATIENT
Start: 2023-02-22 | End: 2023-02-23 | Stop reason: HOSPADM

## 2023-02-22 RX ADMIN — MECLIZINE HYDROCHLORIDE 50 MG: 25 TABLET ORAL at 23:31

## 2023-02-22 NOTE — TELEPHONE ENCOUNTER
Her neurologist has placed a different pain referral for her, so we will see what happens with that one.

## 2023-02-22 NOTE — TELEPHONE ENCOUNTER
Caller: NICANOR    Relationship: Provider    Best call back number: [phone]    What is the medical concern/diagnosis: CHRONIC PAIN (G89.29 (ICD-10-CM) - Other chronic pain)    What specialty or service is being requested: MEDICAL/SURGICAL DERMATOLOGY OR GENERAL SURGERY       Any additional details: NICANOR WITH [facility] CALLED STATING DR RONEL PIÑA CAN NOT FULFILL THE REFERRAL REQUEST AS HER CONDITION HE DOES NOT TREAT. HE RECOMMENDED REFERRING PATIENT TO THE SPECIALIST FACILITIES LISTED ABOVE. PLEASE ADVISE

## 2023-02-23 LAB
HOLD SPECIMEN: NORMAL
QT INTERVAL: 392 MS
QTC INTERVAL: 455 MS

## 2023-04-10 DIAGNOSIS — G89.29 OTHER CHRONIC PAIN: ICD-10-CM

## 2023-04-10 RX ORDER — AMITRIPTYLINE HYDROCHLORIDE 25 MG/1
TABLET, FILM COATED ORAL
Qty: 60 TABLET | Refills: 1 | Status: SHIPPED | OUTPATIENT
Start: 2023-04-10

## 2023-05-04 ENCOUNTER — TELEPHONE (OUTPATIENT)
Dept: FAMILY MEDICINE CLINIC | Facility: CLINIC | Age: 43
End: 2023-05-04

## 2023-05-04 NOTE — TELEPHONE ENCOUNTER
Caller: Desmond Bishop    Relationship: Self    Best call back number: [phone]    Who is your current provider: MAR PEDERSON     Who would you like your new provider to be: LAXMI DIGGS     What are your reasons for transferring care: PATIENT STATES WHEN SHE SAW MAR PEDERSON FOR HER NEW PATIENT APPOINTMENT ON 2/14/23 MAR DID NOT KNOW WHAT TO PRESCRIBE PATIENT FOR HER SYMPTOMS. PATIENT STATES SHE WOULD LIKE TO SEE A PHYSICIAN INSTEAD OF AN APRN.     Additional notes:

## 2023-05-05 ENCOUNTER — TELEPHONE (OUTPATIENT)
Dept: FAMILY MEDICINE CLINIC | Facility: CLINIC | Age: 43
End: 2023-05-05
Payer: MEDICARE

## 2023-05-05 NOTE — TELEPHONE ENCOUNTER
Called Pt multiple times to get scheduled to est care with new provider with no answer. Was unable to leave message, voice mail not set up.

## 2023-05-25 ENCOUNTER — TELEPHONE (OUTPATIENT)
Dept: FAMILY MEDICINE CLINIC | Facility: CLINIC | Age: 43
End: 2023-05-25

## 2023-05-25 NOTE — TELEPHONE ENCOUNTER
Caller: Desmond Bishop    Relationship: Self    Best call back number: [phone]    What medication are you requesting: AN ANTIBIOTIC AND PAIN MEDICATION     What are your current symptoms: TOOTH PAIN     How long have you been experiencing symptoms: A DAY       If a prescription is needed, what is your preferred pharmacy and phone number: [facility] #[id number] - [place], [place] - [address] [facility] [address] [facility] [phone] [facility] [phone] FX     Additional notes: THE PATIENT STATES THAT SHE HAS TOOTH PAIN AND NEEDS A MEDICATION FOR THE PAIN AND AN ANTIBIOTIC THE PATIENT STATES THAT SHE CANT GET TO A DENTIST UNTIL 06/07/2023

## 2023-05-30 ENCOUNTER — TELEPHONE (OUTPATIENT)
Dept: FAMILY MEDICINE CLINIC | Facility: CLINIC | Age: 43
End: 2023-05-30
Payer: MEDICARE

## 2023-05-30 DIAGNOSIS — K04.7 DENTAL INFECTION: Primary | ICD-10-CM

## 2023-05-30 RX ORDER — AMOXICILLIN AND CLAVULANATE POTASSIUM 875; 125 MG/1; MG/1
1 TABLET, FILM COATED ORAL 2 TIMES DAILY
Qty: 20 TABLET | Refills: 0 | Status: SHIPPED | OUTPATIENT
Start: 2023-05-30

## 2023-05-30 NOTE — TELEPHONE ENCOUNTER
PUT IN REQUEST FOR CHANGE OF PRIMARY CARE TO FROM MAR PEDERSON TO DR DIGGS.  OK WITH MAR JUST NEEDING DR DIGGS'S OK.  PLEASE ADVISE PATIENT.

## 2023-05-30 NOTE — TELEPHONE ENCOUNTER
HUB TO READ    No answer    I sent in antibiotics. I can't rx anything for pain. I recommend tylenol and motrin prn.

## 2023-06-09 DIAGNOSIS — G89.29 OTHER CHRONIC PAIN: ICD-10-CM

## 2023-06-09 RX ORDER — AMITRIPTYLINE HYDROCHLORIDE 25 MG/1
TABLET, FILM COATED ORAL
Qty: 60 TABLET | Refills: 1 | Status: SHIPPED | OUTPATIENT
Start: 2023-06-09

## 2023-07-12 ENCOUNTER — TELEPHONE (OUTPATIENT)
Dept: FAMILY MEDICINE CLINIC | Facility: CLINIC | Age: 43
End: 2023-07-12

## 2023-07-12 NOTE — TELEPHONE ENCOUNTER
I tried calling patient via  but the  only got a constant ring with no option to leave a voicemail.

## 2023-07-12 NOTE — TELEPHONE ENCOUNTER
Caller: Desmond Bishop    Relationship: Self    Best call back number: [phone]     Who are you requesting to speak with (clinical staff, provider,  specific staff member): CLINICAL STAFF    What was the call regarding: PATIENT WOULD LIKE TO DISCUSS MEDICATIONS.

## 2023-08-16 DIAGNOSIS — G89.29 OTHER CHRONIC PAIN: ICD-10-CM

## 2023-08-16 RX ORDER — AMITRIPTYLINE HYDROCHLORIDE 25 MG/1
TABLET, FILM COATED ORAL
Qty: 60 TABLET | Refills: 1 | Status: SHIPPED | OUTPATIENT
Start: 2023-08-16

## 2023-09-18 RX ORDER — MELOXICAM 7.5 MG/1
7.5 TABLET ORAL DAILY
Qty: 30 TABLET | Refills: 5 | Status: SHIPPED | OUTPATIENT
Start: 2023-09-18

## 2023-10-15 DIAGNOSIS — G89.29 OTHER CHRONIC PAIN: ICD-10-CM

## 2023-10-16 RX ORDER — AMITRIPTYLINE HYDROCHLORIDE 25 MG/1
TABLET, FILM COATED ORAL
Qty: 60 TABLET | Refills: 1 | Status: SHIPPED | OUTPATIENT
Start: 2023-10-16

## 2024-01-20 DIAGNOSIS — G89.29 OTHER CHRONIC PAIN: ICD-10-CM

## 2024-01-20 RX ORDER — AMITRIPTYLINE HYDROCHLORIDE 25 MG/1
TABLET, FILM COATED ORAL
Qty: 60 TABLET | Refills: 1 | Status: SHIPPED | OUTPATIENT
Start: 2024-01-20

## 2024-01-22 RX ORDER — SUMATRIPTAN 100 MG/1
100 TABLET, FILM COATED ORAL ONCE AS NEEDED
Qty: 16 TABLET | Refills: 5 | Status: SHIPPED | OUTPATIENT
Start: 2024-01-22

## 2024-09-03 ENCOUNTER — TELEPHONE (OUTPATIENT)
Dept: FAMILY MEDICINE CLINIC | Facility: CLINIC | Age: 44
End: 2024-09-03
Payer: MEDICARE

## 2024-09-03 NOTE — TELEPHONE ENCOUNTER
Caller: Desmond Bishop    Relationship: Self    Best call back number: [phone]     What medication are you requesting: BACLOFEN     What are your current symptoms: SWELLING/ INFLAMMATION IN LEGS    If a prescription is needed, what is your preferred pharmacy and phone number: [facility] #[id number] - [place], [place] - [address] AT [facility] [address] [facility] [phone] [facility] [phone]      Additional notes: PLEASE CALL PATIENT WHEN MEDICATION HAS BEEN APPROVED OR DENIED.

## 2024-09-03 NOTE — TELEPHONE ENCOUNTER
HUB TO RELAY    LVM.   She has not been seen in over a year, so no I cannot send in medication. In addition, she requested to transfer to Dr. Edward, preferring to see a MD instead of midlevel. She will need to make appt with Dr. Edward for any medication.

## 2024-09-03 NOTE — TELEPHONE ENCOUNTER
She has not been seen in over a year, so no I cannot send in medication. In addition, she requested to transfer to Dr. Edward, preferring to see a MD instead of midlevel. She will need to make appt with Dr. Edward for any medication.

## 2024-09-04 NOTE — TELEPHONE ENCOUNTER
Name: Bishop, Yajonia      Relationship: Self      Best Callback Number: [phone]       HUB PROVIDED THE RELAY MESSAGE FROM THE OFFICE      PATIENT: VOICED UNDERSTANDING AND HAS NO FURTHER QUESTIONS AT THIS TIME

## 2024-09-20 ENCOUNTER — TELEPHONE (OUTPATIENT)
Dept: FAMILY MEDICINE CLINIC | Facility: CLINIC | Age: 44
End: 2024-09-20
Payer: MEDICARE

## 2024-11-17 ENCOUNTER — HOSPITAL ENCOUNTER (EMERGENCY)
Facility: HOSPITAL | Age: 44
Discharge: HOME | End: 2024-11-17
Attending: STUDENT IN AN ORGANIZED HEALTH CARE EDUCATION/TRAINING PROGRAM | Admitting: STUDENT IN AN ORGANIZED HEALTH CARE EDUCATION/TRAINING PROGRAM
Payer: MEDICARE

## 2024-11-17 VITALS
TEMPERATURE: 98.1 F | RESPIRATION RATE: 18 BRPM | HEART RATE: 98 BPM | HEIGHT: 64 IN | WEIGHT: 190 LBS | OXYGEN SATURATION: 99 % | DIASTOLIC BLOOD PRESSURE: 88 MMHG | SYSTOLIC BLOOD PRESSURE: 132 MMHG | BODY MASS INDEX: 32.44 KG/M2

## 2024-11-17 DIAGNOSIS — L03.221 CELLULITIS AND ABSCESS OF NECK: Primary | ICD-10-CM

## 2024-11-17 DIAGNOSIS — L02.11 CELLULITIS AND ABSCESS OF NECK: Primary | ICD-10-CM

## 2024-11-17 PROCEDURE — 63710000001 ONDANSETRON ODT 4 MG TABLET DISPERSIBLE: Performed by: STUDENT IN AN ORGANIZED HEALTH CARE EDUCATION/TRAINING PROGRAM

## 2024-11-17 PROCEDURE — 87070 CULTURE OTHR SPECIMN AEROBIC: CPT | Performed by: NURSE PRACTITIONER

## 2024-11-17 PROCEDURE — 99283 EMERGENCY DEPT VISIT LOW MDM: CPT

## 2024-11-17 PROCEDURE — 25010000002 LIDOCAINE PF 1% 1 % SOLUTION: Performed by: STUDENT IN AN ORGANIZED HEALTH CARE EDUCATION/TRAINING PROGRAM

## 2024-11-17 PROCEDURE — 87205 SMEAR GRAM STAIN: CPT | Performed by: NURSE PRACTITIONER

## 2024-11-17 PROCEDURE — 87186 SC STD MICRODIL/AGAR DIL: CPT | Performed by: NURSE PRACTITIONER

## 2024-11-17 PROCEDURE — 87077 CULTURE AEROBIC IDENTIFY: CPT | Performed by: NURSE PRACTITIONER

## 2024-11-17 RX ORDER — OXYCODONE AND ACETAMINOPHEN 5; 325 MG/1; MG/1
1 TABLET ORAL ONCE
Status: COMPLETED | OUTPATIENT
Start: 2024-11-17 | End: 2024-11-17

## 2024-11-17 RX ORDER — LIDOCAINE HYDROCHLORIDE 10 MG/ML
10 INJECTION, SOLUTION EPIDURAL; INFILTRATION; INTRACAUDAL; PERINEURAL ONCE
Status: COMPLETED | OUTPATIENT
Start: 2024-11-17 | End: 2024-11-17

## 2024-11-17 RX ORDER — HYDROCODONE BITARTRATE AND ACETAMINOPHEN 5; 325 MG/1; MG/1
1 TABLET ORAL ONCE
Status: COMPLETED | OUTPATIENT
Start: 2024-11-17 | End: 2024-11-17

## 2024-11-17 RX ORDER — CEPHALEXIN 500 MG/1
500 CAPSULE ORAL 4 TIMES DAILY
Qty: 28 CAPSULE | Refills: 0 | Status: SHIPPED | OUTPATIENT
Start: 2024-11-17 | End: 2024-11-24

## 2024-11-17 RX ORDER — ONDANSETRON 4 MG/1
4 TABLET, ORALLY DISINTEGRATING ORAL ONCE
Status: COMPLETED | OUTPATIENT
Start: 2024-11-17 | End: 2024-11-17

## 2024-11-17 RX ORDER — HYDROCODONE BITARTRATE AND ACETAMINOPHEN 5; 325 MG/1; MG/1
1 TABLET ORAL EVERY 6 HOURS PRN
Qty: 8 TABLET | Refills: 0 | Status: SHIPPED | OUTPATIENT
Start: 2024-11-17 | End: 2024-11-19

## 2024-11-17 RX ORDER — DOXYCYCLINE 100 MG/1
100 CAPSULE ORAL 2 TIMES DAILY
Qty: 14 CAPSULE | Refills: 0 | Status: SHIPPED | OUTPATIENT
Start: 2024-11-17

## 2024-11-17 RX ORDER — DOXYCYCLINE 100 MG/1
100 CAPSULE ORAL ONCE
Status: COMPLETED | OUTPATIENT
Start: 2024-11-17 | End: 2024-11-17

## 2024-11-17 RX ADMIN — ONDANSETRON 4 MG: 4 TABLET, ORALLY DISINTEGRATING ORAL at 13:29

## 2024-11-17 RX ADMIN — CEPHALEXIN 500 MG: 250 CAPSULE ORAL at 14:19

## 2024-11-17 RX ADMIN — OXYCODONE HYDROCHLORIDE AND ACETAMINOPHEN 1 TABLET: 5; 325 TABLET ORAL at 14:19

## 2024-11-17 RX ADMIN — Medication 3 ML: at 13:25

## 2024-11-17 RX ADMIN — HYDROCODONE BITARTRATE AND ACETAMINOPHEN 1 TABLET: 5; 325 TABLET ORAL at 13:29

## 2024-11-17 RX ADMIN — DOXYCYCLINE 100 MG: 100 CAPSULE ORAL at 14:19

## 2024-11-17 RX ADMIN — LIDOCAINE HYDROCHLORIDE 10 ML: 10 INJECTION, SOLUTION EPIDURAL; INFILTRATION; INTRACAUDAL; PERINEURAL at 13:22

## 2024-11-17 NOTE — ED PROVIDER NOTES
EMERGENCY DEPARTMENT ENCOUNTER    Pt Name: Desmond Bishop  MRN: [id number]  Pt :   1980  Room Number:  26SF/26  Date of encounter:  2024  PCP: Evangelina Pierre PA-C  ED Provider: MANGO Magana    Historian: Patient      HPI:  Chief Complaint: Abscess        Context: Desmond Bishop is a 44 y.o. female who presents to the ED c/o abscess to the right posterior neck since 5 days ago.  Reports intense pain and pressure radiating up the scalp.  Denies fever, chills or difficulty swallowing.  She bought amoxicillin in [place] and was taking for the past few days without improvement      PAST MEDICAL HISTORY  No past medical history on file.      PAST SURGICAL HISTORY  Past Surgical History:   Procedure Laterality Date    CHOLECYSTECTOMY      FRACTURE SURGERY  2013    Surgery in my knee and my hip    HIP SURGERY      LYMPH NODE BIOPSY      TUBAL ABDOMINAL LIGATION           FAMILY HISTORY  No family history on file.      SOCIAL HISTORY  Social History     Socioeconomic History    Marital status: Single   Tobacco Use    Smoking status: Every Day     Current packs/day: 0.50     Average packs/day: 0.5 packs/day for 16.2 years (8.1 ttl pk-yrs)     Types: Cigarettes     Start date: 9/3/2008     Passive exposure: Never    Smokeless tobacco: Never   Substance and Sexual Activity    Alcohol use: No    Drug use: No    Sexual activity: Defer         ALLERGIES  Patient has no known allergies.        REVIEW OF SYSTEMS  Review of Systems       All systems reviewed and negative except for those discussed in HPI.       PHYSICAL EXAM    I have reviewed the triage vital signs and nursing notes.    ED Triage Vitals [24 1243]   Temp Heart Rate Resp BP SpO2   98.1 °F (36.7 °C) 112 18 126/82 99 %      Temp src Heart Rate Source Patient Position BP Location FiO2 (%)   Oral Monitor Sitting Right arm --       Physical Exam  Neck:         GENERAL:   Appears in no acute distress.  Obese  HENT: Nares  Patient c/o ear aches,headaches,and bodyaches on and off x1 week. Patient's brother tested positive for covid and found out today. Here for testing.Taking advil otc prn-last dose per patient was last evening.Patient was masked. I had full PPE attire on while rooming and swabbing for covid.   "patent.  EYES: No scleral icterus.  CV: Regular rhythm, regular rate.  RESPIRATORY: Normal effort.  No audible wheezes, rales or rhonchi.  ABDOMEN: Soft, nontender  MUSCULOSKELETAL: No deformities.   NEURO: Alert, moves all extremities, follows commands.  SKIN: Warm, dry, no rash visualized.  Abscess right posterior neck      LAB RESULTS  No results found for this or any previous visit (from the past 24 hours).    If labs were ordered, I independently reviewed the results and considered them in treating the patient.      PROCEDURES    Incision & Drainage    Date/Time: 11/17/2024 2:20 PM    Performed by: Dae Young MD  Authorized by: Dae Young MD    Consent:     Consent obtained:  Verbal    Consent given by:  Patient    Risks discussed:  Bleeding, incomplete drainage and pain  Universal protocol:     Patient identity confirmed:  Verbally with patient and arm band  Location:     Type:  Abscess    Size:  6 x3 cm    Location:  Neck    Neck location:  R posterior  Pre-procedure details:     Skin preparation:  Chlorhexidine  Sedation:     Sedation type:  None  Anesthesia:     Anesthesia method:  Topical application and local infiltration    Topical anesthetic:  LET    Local anesthetic:  Lidocaine 1% w/o epi  Procedure type:     Complexity:  Simple  Procedure details:     Ultrasound guidance: yes      Needle aspiration: no      Incision types:  Single straight    Incision depth:  Subcutaneous    Wound management:  Probed and deloculated, irrigated with saline and extensive cleaning    Drainage:  Purulent    Drainage amount:  Moderate    Wound treatment:  Drain placed    Packing materials:  1/2 in iodoform gauze    Amount 1/2\" iodoform:  3 cm  Post-procedure details:     Procedure completion:  Tolerated well, no immediate complications      No orders to display       MEDICATIONS GIVEN IN ER    Medications   lidocaine PF 1% (XYLOCAINE) injection 10 mL (has no administration in time range) "   HYDROcodone-acetaminophen (NORCO) 5-325 MG per tablet 1 tablet (1 tablet Oral Given 11/17/24 1329)   Lido-EPINEPHrine-Tetracaine 4-0.18-0.5 % gel 3 mL (3 mL Topical Given 11/17/24 1325)   ondansetron ODT (ZOFRAN-ODT) disintegrating tablet 4 mg (4 mg Oral Given 11/17/24 1329)   doxycycline (MONODOX) capsule 100 mg (100 mg Oral Given 11/17/24 1419)   cephalexin (KEFLEX) capsule 500 mg (500 mg Oral Given 11/17/24 1419)   oxyCODONE-acetaminophen (PERCOCET) 5-325 MG per tablet 1 tablet (1 tablet Oral Given 11/17/24 1419)         MEDICAL DECISION MAKING, PROGRESS, and CONSULTS    All labs, if obtained, have been independently reviewed by me.  All radiology studies, if obtained, have been reviewed by me and the radiologist dictating the report.  All EKG's, if obtained, have been independently viewed and interpreted by me/my attending physician.      Discussion below represents my analysis of pertinent findings related to patient's condition, differential diagnosis, treatment plan and final disposition.  Patient is 44-year-old female who presents for evaluation of abscess to the right side of the neck posteriorly.  On physical exam she was nontoxic-appearing, 6 x 3 cm oval erythematous lesion with central fluctuance noted on exam.  Abscess was drained, see procedure note, patient discharged home with pain control, Keflex and doxycycline, wound cultures pending.  Advised packing removal and wound check in 48 hours by PCP or ER.  Return sooner for worsening.                       Differential diagnosis:    Cellulitis, abscess, sebaceous cyst      Additional sources:    - Discussed/ obtained information from independent historians: Sister    - External (non-ED) record review: Encounter for abscess drainage on posterior neck 12/7/2021    - Chronic or social conditions impacting care: Obesity    - Shared decision making: Patient      Orders placed during this visit:  Orders Placed This Encounter   Procedures    Incision &  Drainage    Wound Culture - Wound, Neck         Additional orders considered but not ordered:  Lab work    ED Course:    Consultants:                  Shared Decision Making:  After my consideration of clinical presentation and any laboratory/radiology studies obtained, I discussed the findings with the patient/patient representative who is in agreement with the treatment plan and the final disposition.   Risks and benefits of discharge and/or observation/admission were discussed.       AS OF 14:32 EST VITALS:    BP - 126/82  HR - 112  TEMP - 98.1 °F (36.7 °C) (Oral)  O2 SATS - 99%                  DIAGNOSIS  Final diagnoses:   Cellulitis and abscess of neck         DISPOSITION  DISCHARGE    Patient discharged in stable condition.    Reviewed implications of results, diagnosis, meds, responsibility to follow up, warning signs and symptoms of possible worsening, potential complications and reasons to return to ER.    Patient/Family voiced understanding of above instructions.    Discussed plan for discharge, as there is no emergent indication for admission.  Pt/family is agreeable and understands need for follow up and possible repeat testing.  Pt/family is aware that discharge does not mean that nothing is wrong but that it indicates no emergency is currently present that requires admission and they must continue care with follow-up as given below or with a physician of their choice.     FOLLOW-UP  Evangelina Pierre PA-C  [address] [id number] [phone]          [facility] EMERGENCY DEPARTMENT  [address] [facility] [address] [phone]             Medication List        New Prescriptions      cephalexin 500 MG capsule  Commonly known as: KEFLEX  Take 1 capsule by mouth 4 (Four) Times a Day for 7 days.     doxycycline 100 MG capsule  Commonly known as: MONODOX  Take 1 capsule by mouth 2 (Two) Times a Day.     HYDROcodone-acetaminophen 5-325 MG per  tablet  Commonly known as: NORCO  Take 1 tablet by mouth Every 6 (Six) Hours As Needed for Severe Pain for up to 2 days.               Where to Get Your Medications        [id number] - [place], [place] - [address] AT [facility] [address] - [phone]  - [phone] FX  [address], [facility] [address]      Phone: [phone]   cephalexin 500 MG capsule  doxycycline 100 MG capsule  HYDROcodone-acetaminophen 5-325 MG per tablet            Please note that portions of this document were completed with voice recognition software.     MANGO Magana   11/17/24   14:32 EST        Lisa Cabello APRN  11/17/24 1432

## 2024-11-17 NOTE — Clinical Note
[facility] EMERGENCY DEPARTMENT  [address]  [facility] [address]  Phone: [phone]    Desmond Bishop was seen and treated in our emergency department on 11/17/2024.  She may return to work on 11/20/2024.  Follow-up with primary healthcare provider or return to ER for wound recheck and packing removal in 48 hours.  Take antibiotics as prescribed       Thank you for choosing [facility].    Dae Young MD

## 2024-11-19 LAB
BACTERIA SPEC AEROBE CULT: ABNORMAL
GRAM STN SPEC: ABNORMAL